# Patient Record
Sex: MALE | Race: WHITE | NOT HISPANIC OR LATINO | Employment: UNEMPLOYED
[De-identification: names, ages, dates, MRNs, and addresses within clinical notes are randomized per-mention and may not be internally consistent; named-entity substitution may affect disease eponyms.]

---

## 2018-07-03 ENCOUNTER — RECORDS - HEALTHEAST (OUTPATIENT)
Dept: ADMINISTRATIVE | Facility: OTHER | Age: 58
End: 2018-07-03

## 2018-07-12 ENCOUNTER — RECORDS - HEALTHEAST (OUTPATIENT)
Dept: LAB | Facility: HOSPITAL | Age: 58
End: 2018-07-12

## 2018-07-12 ENCOUNTER — HOSPITAL ENCOUNTER (OUTPATIENT)
Dept: MRI IMAGING | Facility: HOSPITAL | Age: 58
Discharge: HOME | End: 2018-07-12
Attending: PSYCHIATRY & NEUROLOGY

## 2018-07-12 ENCOUNTER — HOSPITAL ENCOUNTER (OUTPATIENT)
Dept: RADIOLOGY | Facility: HOSPITAL | Age: 58
Discharge: HOME | End: 2018-07-12
Attending: PSYCHIATRY & NEUROLOGY

## 2018-07-12 DIAGNOSIS — G25.81 RLS (RESTLESS LEGS SYNDROME): ICD-10-CM

## 2018-07-12 DIAGNOSIS — G35 MULTIPLE SCLEROSIS (H): ICD-10-CM

## 2018-07-12 DIAGNOSIS — R25.2 SPASTICITY: ICD-10-CM

## 2018-07-12 DIAGNOSIS — S05.40XA FOREIGN BODY BEHIND THE EYE: ICD-10-CM

## 2018-07-12 DIAGNOSIS — R27.0 ATAXIA: ICD-10-CM

## 2018-07-12 LAB
TSH SERPL DL<=0.005 MIU/L-ACNC: 0.44 UIU/ML (ref 0.3–5)
VIT B12 SERPL-MCNC: 578 PG/ML (ref 213–816)

## 2020-09-04 PROBLEM — L25.9 CONTACT DERMATITIS AND OTHER ECZEMA, DUE TO UNSPECIFIED CAUSE: Status: ACTIVE | Noted: 2020-09-04

## 2020-09-04 PROBLEM — M54.16 LUMBAR RADICULOPATHY: Status: ACTIVE | Noted: 2020-09-04

## 2020-09-04 PROBLEM — J42 CHRONIC BRONCHITIS (H): Status: ACTIVE | Noted: 2020-09-04

## 2020-09-04 PROBLEM — R25.2 SPASTICITY: Status: ACTIVE | Noted: 2020-09-04

## 2020-09-04 PROBLEM — M75.81 RIGHT ROTATOR CUFF TENDONITIS: Status: ACTIVE | Noted: 2019-11-22

## 2020-09-04 PROBLEM — R27.0 ATAXIA: Status: ACTIVE | Noted: 2020-09-04

## 2020-09-04 PROBLEM — M19.011 OSTEOARTHRITIS OF RIGHT ACROMIOCLAVICULAR JOINT: Status: ACTIVE | Noted: 2019-11-22

## 2020-09-04 PROBLEM — E78.5 HYPERLIPIDEMIA: Status: ACTIVE | Noted: 2020-09-04

## 2020-09-04 PROBLEM — R53.83 FATIGUE: Status: ACTIVE | Noted: 2020-09-04

## 2020-09-04 PROBLEM — R52 PAIN: Status: ACTIVE | Noted: 2020-09-04

## 2020-09-04 PROBLEM — R29.890 LOSS OF HEIGHT: Status: ACTIVE | Noted: 2020-09-04

## 2020-09-04 PROBLEM — M54.50 LOWER BACK PAIN: Status: ACTIVE | Noted: 2020-09-04

## 2020-09-04 PROBLEM — E29.1 OTHER TESTICULAR HYPOFUNCTION: Status: ACTIVE | Noted: 2020-09-04

## 2020-09-08 ENCOUNTER — OFFICE VISIT (OUTPATIENT)
Dept: NEUROLOGY | Facility: CLINIC | Age: 60
End: 2020-09-08
Payer: MEDICARE

## 2020-09-08 VITALS — HEIGHT: 66 IN | BODY MASS INDEX: 26.52 KG/M2 | WEIGHT: 165 LBS

## 2020-09-08 DIAGNOSIS — G35 MULTIPLE SCLEROSIS (H): Primary | ICD-10-CM

## 2020-09-08 DIAGNOSIS — R41.3 MEMORY LOSS: ICD-10-CM

## 2020-09-08 PROCEDURE — 99215 OFFICE O/P EST HI 40 MIN: CPT | Mod: 95 | Performed by: PSYCHIATRY & NEUROLOGY

## 2020-09-08 RX ORDER — PRAMIPEXOLE DIHYDROCHLORIDE 0.5 MG/1
0.5 TABLET ORAL AT BEDTIME
Qty: 90 TABLET | Refills: 3 | Status: SHIPPED | OUTPATIENT
Start: 2020-09-08 | End: 2021-10-25

## 2020-09-08 RX ORDER — PRAMIPEXOLE DIHYDROCHLORIDE 0.12 MG/1
1 TABLET ORAL 2 TIMES DAILY
COMMUNITY
Start: 2020-08-18 | End: 2020-09-08

## 2020-09-08 RX ORDER — PRAMIPEXOLE DIHYDROCHLORIDE 0.5 MG/1
1 TABLET ORAL DAILY
COMMUNITY
Start: 2020-08-07 | End: 2020-09-08

## 2020-09-08 RX ORDER — BACLOFEN 10 MG/1
10 TABLET ORAL 4 TIMES DAILY
Qty: 360 TABLET | Refills: 3 | Status: SHIPPED | OUTPATIENT
Start: 2020-09-08 | End: 2022-01-31

## 2020-09-08 RX ORDER — BACLOFEN 10 MG/1
10 TABLET ORAL 3 TIMES DAILY
COMMUNITY
End: 2020-09-08

## 2020-09-08 RX ORDER — PRAMIPEXOLE DIHYDROCHLORIDE 0.12 MG/1
0.12 TABLET ORAL 2 TIMES DAILY
Qty: 180 TABLET | Refills: 3 | Status: SHIPPED | OUTPATIENT
Start: 2020-09-08 | End: 2022-01-11

## 2020-09-08 RX ORDER — PSYLLIUM HUSK 0.4 G
1000 CAPSULE ORAL DAILY
COMMUNITY

## 2020-09-08 ASSESSMENT — MIFFLIN-ST. JEOR: SCORE: 1496.19

## 2020-09-08 NOTE — NURSING NOTE
Chief Complaint   Patient presents with     Follow Up     MS-states he is doing pretty well however would like to discuss the RLS and possibly increase the medication for that, also would like to discuss some memory changes with difficulty recalling at times      Video visit [phone]-doximcosmo Lamb on 9/8/2020 at 11:25 AM

## 2020-09-08 NOTE — LETTER
"    9/8/2020         RE: Tim Driscoll  [address]  [facility] [address]        Dear Colleague,    Thank you for referring your patient, Tim Driscoll, to the [facility] [place]. Please see a copy of my visit note below.    Video visit requested by patient  Video visit due to the COVID-19 pandemic  Text number [phone]  Patient identified      .Patient is being evaluated via a billable video visit. The patient has been notified of following:     \"This video visit will be conducted via a call between you and your physician/provider. We have found that certain health care needs can be provided without the need for an in-person physical exam. This service lets us provide the care you need with a video conversation. If a prescription is necessary we can send it directly to your pharmacy. If lab work is needed we can place an order for that and you can then stop by our lab to have the test done at a later time.    If during the course of the call the physician/provider feels a video visit is not appropriate, you will not be charged for this service.    Physician has received verbal consent for a Video Visit from the patient? YES    Patient would like the video invitation sent by: Text [phone]    Video Visit Details    Type of service: Video Visit    Video Start Time: 11:43 AM    Video End Time (time video stopped): 12:15 PM    Originating Location (pt. Location): Patient's Home    Distant Location (provider location): [facility]     Mode of Communication: Video Conference via [employer]                    Chief complaint  Multiple sclerosis diagnosed sometime around 2000/1999  Spasticity and tightness of his legs  Spastic gait which aggravates his hips  Some restless leg type symptomatology  Past history of lumbar fusion L3-L5 in the past      Patient is a 60-year-old  Original multiple sclerosis diagnosed in 1999 or so  Has had " difficulty with side effects from medicines and actually passed out with 1 of the injectable medicine so Gilenya would not be a good choice for him    We talked about Tecfidera again    He has a new complaint where he feels that he has trouble with his attention and memory  Wife confirms that he will forget things in the middle of a conversation with a slight hand he is back on track  It can happen daily  He might snore a little bit but not as much as his wife  Does have the restless leg which too, and this disrupts sleep  He has a lot of fatigue      He has some chronic lazy eye and double vision horizontally split but he does not think it is a lot worse    Still does not want to use a cane or walking stick    Talk to him about gait safety though as he has been climbing on ladders recommended that he not do this due to his poor balance    Since last seen  No hospitalizations no surgeries no major illnesses  No falls or injuries      Patient states his long as he is busy up and around moving his hips are too bad after he goes to sleep he wakes up in the morning there is stiffer and sore they limber up as he moves around    Does not really have radicular symptoms  Denies any new bowel or bladder difficulty                  some workup in 2018 showin. B12 578, TSH 0.44.  2. MRI scan of the head with and without contrast scattered T2 signal FLAIR changes consistent with MS, but no active enhancement.  3. of the cervical spine, no cord lesions, but degenerative changes, see official report.  4. Thoracic spine MRI shows:  a. T8 6 mm cord lesion with equivocal enhancement.  b. T10 old demyelinating lesion.      1. EMG on 2018 of the lower extremities which was relatively normal.  2. History of back fusion five years ago.  3. Difficulty with his left hip and leg stiffening up, which I think is more from his multiple sclerosis.  4. Does use a little bit of baclofen but uses it more on a p.r.n.  basis.      Patient s past history:    1. Multiple sclerosis diagnosed in 2000/1999 with LP and MRI, followed by Dr. Tru Snow, treated with Copaxone until October 2017.  2. Had an anaphylactic type reaction to Copaxone sometime after being on the medication for almost 15 years.  3. Had a flare-up of paresthesias around the belly button or below in early or mid June 2018 and has spastic legs chronically with some bladder urgency chronically.    Other past history:    1. Low lumbar pain, L3-L5 fusion in the past.  2. Eczema.  3. Restless legs syndrome.  4. Diverticulosis.  5. Carpal tunnel syndrome.    Habits  Patient does smoke a quarter pack of cigarettes per day,  Alcohol, might have two beers or so per week.    Family history is positive for:  Mother with breast cancer, may have gone to the bone.  Father is living at 88.  Four sisters relatively healthy.  One brother with MS.  Two other brothers relatively healthy.                  Review of Systems   No headache no chest pain no shortness of breath no nausea vomiting no diarrhea no fever chills no cough no sore throat    Denies snoring at night at least not as much as his wife    Does have significant fatigue    Urination is okay  Bowel bladder adequate  No new numbness  No new weakness     From his MS might have some rare diplopia or blurry vision at the end of the day when he is tired mostly later in the day when he is watching TV he covers one eye    Swallowing okay  No dysphasia or dysarthria  Arms work relatively okay    Otherwise review systems are negative    General exam  HEENT wears corrective lenses  Moving air well  Abdomen soft  No edema the feet      Neurologic exam  Alert oriented x3  No aphasia  No neglect   year is 2020  Month of September  Date is Tuesday  President is [place]   is Jael    Conversational speech is good      Cranial nerves II through XII  At the end of the day gets some horizontally split vision things he has  had a lazy eye for years  Difficult to see any ophthalmoplegia no nystagmus on exam  Visual fields intact  Since he can talks out of side of his mouth but that his baseline  Tongue twisters are actually good            Upper extremities  No drift  No tremor  A little bit of incoordination with finger-nose bilaterally    Lower extremities  Increased tone little spasticity  Negative straight leg raising sign  No actual focal weakness  Walks with a spastic type gait throws his hips out     gait  Slightly wider base  Hesitancy  Spasticity in the gait           Assessment/Plan       MULTIPLE SCLEROSIS (G35)   History of multiple sclerosis since 1999 currently off immunosuppression  Failed Copaxone and beta interferons in the past, had passing out with 1 of the interferons      Have discussed Tecfidera in the past and he would like to hold off   Symptomatic medicine for treatment of his difficulties    Baclofen 10 mg tab 2 tabs p.o. twice daily usually just use the nighttime dose  Mirapex 0.5 mg at nighttime  Mirapex 0.125 mg 1 tablet p.o. twice daily uses more as needed [place] vitamin D 3 supplement    Currently off immunosuppression did not want to start new treatments     has new complaint of memory difficulty  We will check  B12 and thyroids again  If he snores more in the future consider a sleep study  Check EEG  Check MRI scan of the head to see if there is progression of intracranial plaques    Spasticity and leg difficulties are mainly from his cord plaque    We discussed immunosuppression with Tecfidera again today    82 up his medications for his restless leg a little bit    Follow-up after the above testing face-to-face    45 minutes total care time today greater than 50% face-to-face patient discussing his previous MS medication side effects and benefits of new difficulties with memory.            Currently, he has:    1. Multiple sclerosis since 1999, off immunosuppression.  2. Failed Copaxone and beta  interferons in the past.  3. Last exacerbation was probably in June 2018.  4. Mildly active lesion 6 mm cord lesion at T8 on MRI scan on July 12, 2018.  5. Restless legs syndrome, on Mirapex.  6. Spasticity on baclofen.  7. Rediscussed the Tecfidera.  8. Discussed that he needs to address the hypertension with his primary.  9. He wants to reassess his low back.    Recommend:    1. Consider seeing Ortho about the Hips   2. Continue Baclofen 10 mg tablet, two at night with two other tablets as rescue. He does say that it helped, but he does not use them often.  3. Mirapex 0.5 mg once at nighttime.  4. Mirapex 0.125 mg one tablet p.o. b.i.d., uses more p.r.n.  5. Vitamin D3 supplement.  6. have him reconsider going on immunosuppression.          Again, thank you for allowing me to participate in the care of your patient.        Sincerely,        Tim Rouse MD

## 2020-09-08 NOTE — PATIENT INSTRUCTIONS
Patient Education     Understanding Multiple Sclerosis (MS)    Multiple sclerosis (MS) is a disease of the brain and spinal cord. Unfortunately, there is no cure for MS. But there are many treatments available. Many people with MS can manage their symptoms and lead active, healthy lives. Read on to learn more about MS and its treatments.  What is MS?  The brain is the body s control center. Each part of the brain controls specific functions. These include movement, balance, sensation, and reasoning. The brain controls these functions by sending and receiving messages through nerves. Nerves have a protective covering (myelin). With MS, the myelin on nerves in the brain and spinal cord is damaged. The loss of this covering causes messages traveling along affected nerves to slow or stop. This results in MS symptoms.  Causes and risk factors for MS  Experts don't know what causes MS. But most research suggests that the body s immune system attacks and destroys myelin by mistake. MS most often begins in adults between ages 20 and 40. It happens in women more often than men. It also is more likely to occur if a person has a family history of MS. Smoking does not cause MS, but can make it worse.    Types of MS  There are 4 main types of MS. These are:    Relapsing-remitting MS. This type of MS is the most common. It is marked by isolated episodes of symptoms (also called attacks or flare-ups). Periods of partial or complete recovery follow these episodes. Each attack may be worse than the one before it.    Primary-progressive MS. This type of MS is marked by a slow onset of symptoms that gradually worsen over time. There are no periods of recovery.    Secondary-progressive MS. This type of MS begins as relapsing-remitting MS. After a period of stability, the disease steadily gets worse. About 50% of people with relapsing-remitting MS have secondary-progressive MS within 10 years of their first attack. About all of them  have it within 25 years.    Progressive-relapsing MS. This type of MS includes both slowly progressive symptoms and periods of flare-ups together.   Symptoms of MS  MS symptoms vary from person to person. The type of symptoms a person has depends on which nerves are affected. It also depends on how much nerve damage there is in the brain and spinal cord. A person can also have different symptoms during the course of the disease. Symptoms can include:    Extreme tiredness (fatigue)    Numbness, tingling, or loss of feeling    Pain    Muscle spasms or weakness in the arms, legs, or both    Vision problems, such as rapid eye movements, double vision, or vision loss    Balance and coordination problems    Problems walking or moving the arms, legs, or both    Bowel and bladder control problems    Problems with sexual function    Dizziness    Trouble concentrating, focusing, or remembering things    Trouble reasoning and solving problems    Trouble speaking or swallowing    Depression  Diagnosing MS  MS can be hard to diagnose. Symptoms come and go. They also may seem like those of other health problems. A diagnosis of MS is not made unless a person has had at least 2 or more separate episodes of MS symptoms. To confirm a diagnosis of MS, your healthcare provider will take a detailed history of symptoms. He or she will also give you a neurologic exam to check your muscle strength, balance, coordination, and reflexes. Skills such as thinking, memory, vision, hearing, and talking are also checked. In addition, healthcare providers may also give you the following tests:    MRI. This test provides detailed pictures of the brain and spinal cord. It helps check for areas of damaged nerves. These are often referred to as lesions or plaques.    Visual evoked potentials. This test is done to see how well your optic nerves are working.    Spinal tap (also called lumbar puncture). This test checks the health of the fluid around your  brain and spinal cord for signs of nerve sheath damage (demyelination).    Blood tests. These help rule out other causes of the symptoms.  Treating MS  The goal of treatment is to manage your symptoms and slow the rate at which the disease worsens. You can manage your symptoms in 1 or more of the following ways:    Medicines. Some medicines help keep your body s immune system from attacking the myelin. This may reduce the frequency and severity of attacks. Other medicines help control symptoms or relieve pain when attacks happen.    Rehabilitation (rehab). Symptoms or problems due to MS can interfere with daily living. Rehab includes physical, occupational, or speech therapy. This can help you maintain strength and function. If needed, your healthcare provider will prescribe aids such as canes, walkers, or wheelchairs. You can also make changes in your work or living space to improve your safety.    Supportive services. These include counseling and support groups to help you cope with the challenges of living with MS. Family members and friends may also benefit from these services.    Lifestyle changes. Making certain changes in your lifestyle and daily routine may help you manage symptoms. This includes getting enough rest and regular exercise. It also includes eating healthy foods and reducing stress. It's helpful to figure out and stay away from things that trigger MS episodes.    Other treatments. Researchers are exploring new treatments for MS. Many of these are in clinical trials. This means they are being tested for safety and effectiveness. Your healthcare providers can give you more information about any treatments that might be an option for you.  Long-term concerns  MS is an unpredictable disease. Your experience will be different from other people's experiences. In general, if you have MS, you should have regular visits with your healthcare provider. He or she will watch your symptoms. Your healthcare  provider will review how well your medicines and other treatments work. MS symptoms may get worse as your disease progresses. If this happens, you may need more aggressive care and treatments. Visit the resources below to learn more about MS and what advances researchers are making to find a cure:    National Multiple Sclerosis Society, www.nationalmssociety.org    Multiple Sclerosis Association of [place], www.mymsaa.org  Date Last Reviewed: 2/1/2018 2000-2019 [employer] [address] [place] [address], [place] [address]. All rights reserved. This information is not intended as a substitute for professional medical care. Always follow your healthcare professional's instructions.

## 2020-09-08 NOTE — PROGRESS NOTES
"Video visit requested by patient  Video visit due to the COVID-19 pandemic  Text number [phone]  Patient identified      .Patient is being evaluated via a billable video visit. The patient has been notified of following:     \"This video visit will be conducted via a call between you and your physician/provider. We have found that certain health care needs can be provided without the need for an in-person physical exam. This service lets us provide the care you need with a video conversation. If a prescription is necessary we can send it directly to your pharmacy. If lab work is needed we can place an order for that and you can then stop by our lab to have the test done at a later time.    If during the course of the call the physician/provider feels a video visit is not appropriate, you will not be charged for this service.    Physician has received verbal consent for a Video Visit from the patient? YES    Patient would like the video invitation sent by: Text [phone]    Video Visit Details    Type of service: Video Visit    Video Start Time: 11:43 AM    Video End Time (time video stopped): 12:15 PM    Originating Location (pt. Location): Patient's Home    Distant Location (provider location): [facility] [place]     Mode of Communication: Video Conference via [employer]                    Chief complaint  Multiple sclerosis diagnosed sometime around 2000/1999  Spasticity and tightness of his legs  Spastic gait which aggravates his hips  Some restless leg type symptomatology  Past history of lumbar fusion L3-L5 in the past      Patient is a 60-year-old  Original multiple sclerosis diagnosed in 1999 or so  Has had difficulty with side effects from medicines and actually passed out with 1 of the injectable medicine so Gilenya would not be a good choice for him    We talked about Tecfidera again    He has a new complaint where he feels that he has trouble with his attention and " memory  Wife confirms that he will forget things in the middle of a conversation with a slight hand he is back on track  It can happen daily  He might snore a little bit but not as much as his wife  Does have the restless leg which too, and this disrupts sleep  He has a lot of fatigue      He has some chronic lazy eye and double vision horizontally split but he does not think it is a lot worse    Still does not want to use a cane or walking stick    Talk to him about gait safety though as he has been climbing on ladders recommended that he not do this due to his poor balance    Since last seen  No hospitalizations no surgeries no major illnesses  No falls or injuries      Patient states his long as he is busy up and around moving his hips are too bad after he goes to sleep he wakes up in the morning there is stiffer and sore they limber up as he moves around    Does not really have radicular symptoms  Denies any new bowel or bladder difficulty                  some workup in 2018 showin. B12 578, TSH 0.44.  2. MRI scan of the head with and without contrast scattered T2 signal FLAIR changes consistent with MS, but no active enhancement.  3. of the cervical spine, no cord lesions, but degenerative changes, see official report.  4. Thoracic spine MRI shows:  a. T8 6 mm cord lesion with equivocal enhancement.  b. T10 old demyelinating lesion.      1. EMG on 2018 of the lower extremities which was relatively normal.  2. History of back fusion five years ago.  3. Difficulty with his left hip and leg stiffening up, which I think is more from his multiple sclerosis.  4. Does use a little bit of baclofen but uses it more on a p.r.n. basis.      Patient s past history:    1. Multiple sclerosis diagnosed in  with LP and MRI, followed by Dr. Tru Snow, treated with Copaxone until 2017.  2. Had an anaphylactic type reaction to Copaxone sometime after being on the medication for  almost 15 years.  3. Had a flare-up of paresthesias around the belly button or below in early or mid June 2018 and has spastic legs chronically with some bladder urgency chronically.    Other past history:    1. Low lumbar pain, L3-L5 fusion in the past.  2. Eczema.  3. Restless legs syndrome.  4. Diverticulosis.  5. Carpal tunnel syndrome.    Habits  Patient does smoke a quarter pack of cigarettes per day,  Alcohol, might have two beers or so per week.    Family history is positive for:  Mother with breast cancer, may have gone to the bone.  Father is living at 88.  Four sisters relatively healthy.  One brother with MS.  Two other brothers relatively healthy.                  Review of Systems   No headache no chest pain no shortness of breath no nausea vomiting no diarrhea no fever chills no cough no sore throat    Denies snoring at night at least not as much as his wife    Does have significant fatigue    Urination is okay  Bowel bladder adequate  No new numbness  No new weakness     From his MS might have some rare diplopia or blurry vision at the end of the day when he is tired mostly later in the day when he is watching TV he covers one eye    Swallowing okay  No dysphasia or dysarthria  Arms work relatively okay    Otherwise review systems are negative    General exam  HEENT wears corrective lenses  Moving air well  Abdomen soft  No edema the feet      Neurologic exam  Alert oriented x3  No aphasia  No neglect   year is 2020  Month of September  Date is Tuesday  President is [place]   is Jael    Conversational speech is good      Cranial nerves II through XII  At the end of the day gets some horizontally split vision things he has had a lazy eye for years  Difficult to see any ophthalmoplegia no nystagmus on exam  Visual fields intact  Since he can talks out of side of his mouth but that his baseline  Tongue twisters are actually good            Upper extremities  No drift  No tremor  A little  bit of incoordination with finger-nose bilaterally    Lower extremities  Increased tone little spasticity  Negative straight leg raising sign  No actual focal weakness  Walks with a spastic type gait throws his hips out     gait  Slightly wider base  Hesitancy  Spasticity in the gait           Assessment/Plan       MULTIPLE SCLEROSIS (G35)   History of multiple sclerosis since 1999 currently off immunosuppression  Failed Copaxone and beta interferons in the past, had passing out with 1 of the interferons      Have discussed Tecfidera in the past and he would like to hold off   Symptomatic medicine for treatment of his difficulties    Baclofen 10 mg tab 2 tabs p.o. twice daily usually just use the nighttime dose  Mirapex 0.5 mg at nighttime  Mirapex 0.125 mg 1 tablet p.o. twice daily uses more as needed [place] vitamin D 3 supplement    Currently off immunosuppression did not want to start new treatments     has new complaint of memory difficulty  We will check  B12 and thyroids again  If he snores more in the future consider a sleep study  Check EEG  Check MRI scan of the head to see if there is progression of intracranial plaques    Spasticity and leg difficulties are mainly from his cord plaque    We discussed immunosuppression with Tecfidera again today    82 up his medications for his restless leg a little bit    Follow-up after the above testing face-to-face    45 minutes total care time today greater than 50% face-to-face patient discussing his previous MS medication side effects and benefits of new difficulties with memory.            Currently, he has:    1. Multiple sclerosis since 1999, off immunosuppression.  2. Failed Copaxone and beta interferons in the past.  3. Last exacerbation was probably in June 2018.  4. Mildly active lesion 6 mm cord lesion at T8 on MRI scan on July 12, 2018.  5. Restless legs syndrome, on Mirapex.  6. Spasticity on baclofen.  7. Rediscussed the Tecfidera.  8. Discussed that he  needs to address the hypertension with his primary.  9. He wants to reassess his low back.    Recommend:    1. Consider seeing Ortho about the Hips   2. Continue Baclofen 10 mg tablet, two at night with two other tablets as rescue. He does say that it helped, but he does not use them often.  3. Mirapex 0.5 mg once at nighttime.  4. Mirapex 0.125 mg one tablet p.o. b.i.d., uses more p.r.n.  5. Vitamin D3 supplement.  6. have him reconsider going on immunosuppression.

## 2020-09-14 DIAGNOSIS — R41.3 MEMORY LOSS: ICD-10-CM

## 2020-09-14 DIAGNOSIS — G35 MS (MULTIPLE SCLEROSIS) (H): Primary | ICD-10-CM

## 2020-09-17 DIAGNOSIS — R41.3 MEMORY LOSS: ICD-10-CM

## 2020-09-17 DIAGNOSIS — G35 MS (MULTIPLE SCLEROSIS) (H): ICD-10-CM

## 2020-09-17 LAB
TSH SERPL DL<=0.005 MIU/L-ACNC: 0.63 MU/L (ref 0.4–4)
VIT B12 SERPL-MCNC: 594 PG/ML (ref 193–986)

## 2020-09-17 PROCEDURE — 36415 COLL VENOUS BLD VENIPUNCTURE: CPT | Performed by: PSYCHIATRY & NEUROLOGY

## 2020-09-17 PROCEDURE — 84443 ASSAY THYROID STIM HORMONE: CPT | Performed by: PSYCHIATRY & NEUROLOGY

## 2020-09-17 PROCEDURE — 82607 VITAMIN B-12: CPT | Performed by: PSYCHIATRY & NEUROLOGY

## 2020-09-22 ENCOUNTER — CARE COORDINATION (OUTPATIENT)
Dept: NEUROLOGY | Facility: CLINIC | Age: 60
End: 2020-09-22

## 2020-09-22 ENCOUNTER — HOSPITAL ENCOUNTER (EMERGENCY)
Facility: CLINIC | Age: 60
End: 2020-09-22
Payer: MEDICARE

## 2020-09-22 ENCOUNTER — HOSPITAL ENCOUNTER (OUTPATIENT)
Dept: MRI IMAGING | Facility: CLINIC | Age: 60
Discharge: HOME | End: 2020-09-22
Attending: PSYCHIATRY & NEUROLOGY | Admitting: PSYCHIATRY & NEUROLOGY
Payer: MEDICARE

## 2020-09-22 PROCEDURE — 25500064 ZZH RX 255 OP 636: Performed by: PSYCHIATRY & NEUROLOGY

## 2020-09-22 PROCEDURE — A9585 GADOBUTROL INJECTION: HCPCS | Performed by: PSYCHIATRY & NEUROLOGY

## 2020-09-22 PROCEDURE — 70553 MRI BRAIN STEM W/O & W/DYE: CPT

## 2020-09-22 RX ORDER — GADOBUTROL 604.72 MG/ML
7 INJECTION INTRAVENOUS ONCE
Status: COMPLETED | OUTPATIENT
Start: 2020-09-22 | End: 2020-09-22

## 2020-09-22 RX ADMIN — GADOBUTROL 7 ML: 604.72 INJECTION INTRAVENOUS at 09:31

## 2020-11-24 ENCOUNTER — ANCILLARY PROCEDURE (OUTPATIENT)
Dept: NEUROLOGY | Facility: CLINIC | Age: 60
End: 2020-11-24
Attending: PSYCHIATRY & NEUROLOGY
Payer: MEDICARE

## 2020-11-24 VITALS
SYSTOLIC BLOOD PRESSURE: 128 MMHG | HEART RATE: 60 BPM | DIASTOLIC BLOOD PRESSURE: 88 MMHG | WEIGHT: 153 LBS | BODY MASS INDEX: 24.59 KG/M2 | HEIGHT: 66 IN

## 2020-11-24 DIAGNOSIS — R41.3 MEMORY LOSS: ICD-10-CM

## 2020-11-24 DIAGNOSIS — G35 MULTIPLE SCLEROSIS (H): Primary | ICD-10-CM

## 2020-11-24 PROCEDURE — 99214 OFFICE O/P EST MOD 30 MIN: CPT | Performed by: PSYCHIATRY & NEUROLOGY

## 2020-11-24 PROCEDURE — 95816 EEG AWAKE AND DROWSY: CPT | Performed by: PSYCHIATRY & NEUROLOGY

## 2020-11-24 SDOH — HEALTH STABILITY: MENTAL HEALTH: HOW OFTEN DO YOU HAVE A DRINK CONTAINING ALCOHOL?: 4 OR MORE TIMES A WEEK

## 2020-11-24 SDOH — HEALTH STABILITY: MENTAL HEALTH: HOW OFTEN DO YOU HAVE 6 OR MORE DRINKS ON ONE OCCASION?: NOT ASKED

## 2020-11-24 SDOH — HEALTH STABILITY: MENTAL HEALTH: HOW MANY STANDARD DRINKS CONTAINING ALCOHOL DO YOU HAVE ON A TYPICAL DAY?: NOT ASKED

## 2020-11-24 ASSESSMENT — MIFFLIN-ST. JEOR: SCORE: 1441.75

## 2020-11-24 NOTE — LETTER
11/24/2020         RE: Tim Driscoll  [id number]  [facility] [id number]        Dear Colleague,    Thank you for referring your patient, Tim Driscoll, to the [facility] [place]. Please see a copy of my visit note below.    In person evaluation neurologic consultation/follow-up in regards to MS/memory difficulties            Patient last seen on video follow-up September 8, 2020  Patient seen in person on November 24, 2020      Chief complaint  Multiple sclerosis diagnosed sometime around 2000/1999  Spasticity and tightness of his legs  Spastic gait which aggravates his hips  Some restless leg type symptomatology  Past history of lumbar fusion L3-L5 in the past      Patient is a 60-year-old  Original multiple sclerosis diagnosed in 1999 or so  Has had difficulty with side effects from medicines and actually passed out with 1 of the injectable medicine so Gilenya would not be a good choice for him    We talked about Tecfidera again    He has a new complaint where he feels that he has trouble with his attention and memory  Wife confirms that he will forget things in the middle of a conversation with a slight hand he is back on track  It can happen daily  He might snore a little bit but not as much as his wife  Does have the restless leg which too, and this disrupts sleep  He has a lot of fatigue    Work-up for memory loss  B12 594  TSH 0.23  MRI scan September 22, 2020 stable compared to 2005  EEG normal awake drowsy and sleep stage I record  Formal Willard cognitive assessment score 29 out of 30        He has some chronic lazy eye and double vision horizontally split but he does not think it is a lot worse    Still does not want to use a cane or walking stick    Talk to him about gait safety though as he has been climbing on ladders recommended that he not do this due to his poor balance    Since last seen  No hospitalizations no surgeries no major illnesses  No falls  or injuries      Patient states his long as he is busy up and around moving his hips are too bad after he goes to sleep he wakes up in the morning there is stiffer and sore they limber up as he moves around    Does not really have radicular symptoms  Denies any new bowel or bladder difficulty                  some workup in 2018 showin. B12 578, TSH 0.44.  2. MRI scan of the head with and without contrast scattered T2 signal FLAIR changes consistent with MS, but no active enhancement.  3. of the cervical spine, no cord lesions, but degenerative changes, see official report.  4. Thoracic spine MRI shows:  a. T8 6 mm cord lesion with equivocal enhancement.  b. T10 old demyelinating lesion.      1. EMG on 2018 of the lower extremities which was relatively normal.  2. History of back fusion five years ago.  3. Difficulty with his left hip and leg stiffening up, which I think is more from his multiple sclerosis.  4. Does use a little bit of baclofen but uses it more on a p.r.n. basis.      Patient s past history:    1. Multiple sclerosis diagnosed in  with LP and MRI, followed by Dr. Tru Snow, treated with Copaxone until 2017.  2. Had an anaphylactic type reaction to Copaxone sometime after being on the medication for almost 15 years.  3. Had a flare-up of paresthesias around the belly button or below in early or mid 2018 and has spastic legs chronically with some bladder urgency chronically.    Other past history:    1. Low lumbar pain, L3-L5 fusion in the past.  2. Eczema.  3. Restless legs syndrome.  4. Diverticulosis.  5. Carpal tunnel syndrome.    Habits  Patient does smoke a quarter pack of cigarettes per day,  Alcohol, might have two beers or so per week.    Family history is positive for:  Mother with breast cancer, may have gone to the bone.  Father is living at 88.  Four sisters relatively healthy.  One brother with MS.  Two other brothers relatively  healthy.            Exam  Blood pressure 128/88, pulse 60    Review of systems  No headache no chest pain no shortness of breath no nausea vomiting no diarrhea no fever chills no sore throat no cough    No new focal weakness numbness or tingling  Does have the MS and a little bit of a spastic gait  Wife previously had reported that he might snore but not as much as his wife  Does have some fatigue but could be from MS    Urination is okay  Bowel bladder adequate    From his MS might have some rare diplopia or blurry vision at the end of the day when he is tired mostly later in the day when he is watching TV he covers one eye    Swallowing okay  No dysphasia or dysarthria  Arms work relatively okay    Otherwise review systems are negative    General exam  Alert oriented x3  Neck supple  HEENT normal wears corrective lenses  Lungs clear  Heart rate regular  Abdomen soft  Symmetrical pulses  No edema the feet      Neurologic exam  Prosody of speech normal  Naming normal  Repetition normal  Comprehension normal  No aphasia  No neglect    Formal Dickerson cognitive assessment score 29 out of 30  He did use a memory technique though to remember the 5 words  Had difficulty with the visual spatial sequencing Trail    Able to talk about current events without difficulty    Cranial nerves II through XII significant for  Lazy eye but no significant ophthalmoplegia or nystagmus seen today  Visual fields intact  Talks on the side of his mouth baseline  Tongue twisters good  Visual fields intact face symmetrical    Upper extremities  No drift no tremor  With finger-nose maybe a little bit of incoordination subtle    Lower extremities  Mild increased tone bilaterally spastic  Negative straight leg raising sign  No actual weakness  Walks with a spastic type gait can you throws his hips out suddenly    Gait  Gets up from the chair without difficulty has a mildly spastic gait slightly wider base  Hesitancy  Spasticity in the  gait           Assessment/Plan    Currently, he has:    1. Multiple sclerosis since 1999, off immunosuppression.  2. Failed Copaxone and beta interferons in the past.  3. Last exacerbation was probably in June 2018.  4. Mildly active lesion 6 mm cord lesion at T8 on MRI scan on July 12, 2018.  Follow-up MRI scan head September 22, 2020 similar to 2005 no active lesions  5. Restless legs syndrome, on Mirapex.  6. Spasticity on baclofen.  7. Rediscussed the Tecfidera.  8. Discussed that he needs to address the hypertension with his primary.  9. He wants to reassess his low back.    Recommend:    1. Consider seeing Ortho about the Hips   2. Continue Baclofen 10 mg tablet, two at night with two other tablets as rescue. He does say that it helped, but he does not use them often.  3. Mirapex 0.5 mg once at nighttime.  4. Mirapex 0.125 mg one tablet p.o. b.i.d., uses more p.r.n.  5. Vitamin D3 supplement.  6. have him reconsider going on immunosuppression.         MULTIPLE SCLEROSIS (G35)   History of multiple sclerosis since 1999 currently off immunosuppression  Failed Copaxone and beta interferons in the past, had passing out with 1 of the interferons      Have discussed Tecfidera in the past and he would like to hold off   Symptomatic medicine for treatment of his difficulties    Baclofen 10 mg tab 2 tabs p.o. twice daily usually just use the nighttime dose  Mirapex 0.5 mg at nighttime  Mirapex 0.125 mg 1 tablet p.o. twice daily uses more as needed   vitamin D 3 supplement    Currently off immunosuppression did not want to start new treatments         has new complaint of memory difficulty  We will check  B12 and thyroid testing normal  EEG normal  MRI scan September 22, 2020 compared to 2005 plaque load stable no active lesions    Willard cog assessment score today 29 out of 30 did fairly well    Spasticity and leg difficulties are mainly from his cord plaque    We discussed immunosuppression with Tecfidera again  today      Recent work-up  Labs September 17, 2020  TSH 0.23, B12 594  MRI scan brain September 22, 2020  T1 lesion load low  T2 lesion load moderate  No enhancing lesions  Similar plaque load compared to 2005 stable      If in the future memory is falling off consider looking at a sleep study    Patient wants to hold off at this time    Today's visit was 30 minutes care time with greater than 50% face-to-face discussing his memory cognition and testing above patient will follow-up in August 2021              Again, thank you for allowing me to participate in the care of your patient.        Sincerely,        Tim Rouse MD

## 2020-11-24 NOTE — NURSING NOTE
Chief Complaint   Patient presents with     Results     Discuss EEG and MRI results (MRI done at )     Faby Mao LPN on 11/24/2020 at 11:21 AM

## 2020-11-24 NOTE — PROGRESS NOTES
In person evaluation neurologic consultation/follow-up in regards to MS/memory difficulties            Patient last seen on video follow-up September 8, 2020  Patient seen in person on November 24, 2020      Chief complaint  Multiple sclerosis diagnosed sometime around 2000/1999  Spasticity and tightness of his legs  Spastic gait which aggravates his hips  Some restless leg type symptomatology  Past history of lumbar fusion L3-L5 in the past      Patient is a 60-year-old  Original multiple sclerosis diagnosed in 1999 or so  Has had difficulty with side effects from medicines and actually passed out with 1 of the injectable medicine so Gilenya would not be a good choice for him    We talked about Tecfidera again    He has a new complaint where he feels that he has trouble with his attention and memory  Wife confirms that he will forget things in the middle of a conversation with a slight hand he is back on track  It can happen daily  He might snore a little bit but not as much as his wife  Does have the restless leg which too, and this disrupts sleep  He has a lot of fatigue    Work-up for memory loss  B12 594  TSH 0.23  MRI scan September 22, 2020 stable compared to 2005  EEG normal awake drowsy and sleep stage I record  Formal Willard cognitive assessment score 29 out of 30        He has some chronic lazy eye and double vision horizontally split but he does not think it is a lot worse    Still does not want to use a cane or walking stick    Talk to him about gait safety though as he has been climbing on ladders recommended that he not do this due to his poor balance    Since last seen  No hospitalizations no surgeries no major illnesses  No falls or injuries      Patient states his long as he is busy up and around moving his hips are too bad after he goes to sleep he wakes up in the morning there is stiffer and sore they limber up as he moves around    Does not really have radicular symptoms  Denies any new bowel  or bladder difficulty                  some workup in 2018 showin. B12 578, TSH 0.44.  2. MRI scan of the head with and without contrast scattered T2 signal FLAIR changes consistent with MS, but no active enhancement.  3. of the cervical spine, no cord lesions, but degenerative changes, see official report.  4. Thoracic spine MRI shows:  a. T8 6 mm cord lesion with equivocal enhancement.  b. T10 old demyelinating lesion.      1. EMG on 2018 of the lower extremities which was relatively normal.  2. History of back fusion five years ago.  3. Difficulty with his left hip and leg stiffening up, which I think is more from his multiple sclerosis.  4. Does use a little bit of baclofen but uses it more on a p.r.n. basis.      Patient s past history:    1. Multiple sclerosis diagnosed in  with LP and MRI, followed by Dr. Tru Snow, treated with Copaxone until 2017.  2. Had an anaphylactic type reaction to Copaxone sometime after being on the medication for almost 15 years.  3. Had a flare-up of paresthesias around the belly button or below in early or mid 2018 and has spastic legs chronically with some bladder urgency chronically.    Other past history:    1. Low lumbar pain, L3-L5 fusion in the past.  2. Eczema.  3. Restless legs syndrome.  4. Diverticulosis.  5. Carpal tunnel syndrome.    Habits  Patient does smoke a quarter pack of cigarettes per day,  Alcohol, might have two beers or so per week.    Family history is positive for:  Mother with breast cancer, may have gone to the bone.  Father is living at 88.  Four sisters relatively healthy.  One brother with MS.  Two other brothers relatively healthy.            Exam  Blood pressure 128/88, pulse 60    Review of systems  No headache no chest pain no shortness of breath no nausea vomiting no diarrhea no fever chills no sore throat no cough    No new focal weakness numbness or tingling  Does have the MS and a little bit  of a spastic gait  Wife previously had reported that he might snore but not as much as his wife  Does have some fatigue but could be from MS    Urination is okay  Bowel bladder adequate    From his MS might have some rare diplopia or blurry vision at the end of the day when he is tired mostly later in the day when he is watching TV he covers one eye    Swallowing okay  No dysphasia or dysarthria  Arms work relatively okay    Otherwise review systems are negative    General exam  Alert oriented x3  Neck supple  HEENT normal wears corrective lenses  Lungs clear  Heart rate regular  Abdomen soft  Symmetrical pulses  No edema the feet      Neurologic exam  Prosody of speech normal  Naming normal  Repetition normal  Comprehension normal  No aphasia  No neglect    Formal [place] cognitive assessment score 29 out of 30  He did use a memory technique though to remember the 5 words  Had difficulty with the visual spatial sequencing Trail    Able to talk about current events without difficulty    Cranial nerves II through XII significant for  Lazy eye but no significant ophthalmoplegia or nystagmus seen today  Visual fields intact  Talks on the side of his mouth baseline  Tongue twisters good  Visual fields intact face symmetrical    Upper extremities  No drift no tremor  With finger-nose maybe a little bit of incoordination subtle    Lower extremities  Mild increased tone bilaterally spastic  Negative straight leg raising sign  No actual weakness  Walks with a spastic type gait can you throws his hips out suddenly    Gait  Gets up from the chair without difficulty has a mildly spastic gait slightly wider base  Hesitancy  Spasticity in the gait           Assessment/Plan    Currently, he has:    1. Multiple sclerosis since 1999, off immunosuppression.  2. Failed Copaxone and beta interferons in the past.  3. Last exacerbation was probably in June 2018.  4. Mildly active lesion 6 mm cord lesion at T8 on MRI scan on July 12,  2018.  Follow-up MRI scan head September 22, 2020 similar to 2005 no active lesions  5. Restless legs syndrome, on Mirapex.  6. Spasticity on baclofen.  7. Rediscussed the Tecfidera.  8. Discussed that he needs to address the hypertension with his primary.  9. He wants to reassess his low back.    Recommend:    1. Consider seeing Ortho about the Hips   2. Continue Baclofen 10 mg tablet, two at night with two other tablets as rescue. He does say that it helped, but he does not use them often.  3. Mirapex 0.5 mg once at nighttime.  4. Mirapex 0.125 mg one tablet p.o. b.i.d., uses more p.r.n.  5. Vitamin D3 supplement.  6. have him reconsider going on immunosuppression.         MULTIPLE SCLEROSIS (G35)   History of multiple sclerosis since 1999 currently off immunosuppression  Failed Copaxone and beta interferons in the past, had passing out with 1 of the interferons      Have discussed Tecfidera in the past and he would like to hold off   Symptomatic medicine for treatment of his difficulties    Baclofen 10 mg tab 2 tabs p.o. twice daily usually just use the nighttime dose  Mirapex 0.5 mg at nighttime  Mirapex 0.125 mg 1 tablet p.o. twice daily uses more as needed   vitamin D 3 supplement    Currently off immunosuppression did not want to start new treatments         has new complaint of memory difficulty  We will check  B12 and thyroid testing normal  EEG normal  MRI scan September 22, 2020 compared to 2005 plaque load stable no active lesions    Holiday cog assessment score today 29 out of 30 did fairly well    Spasticity and leg difficulties are mainly from his cord plaque    We discussed immunosuppression with Tecfidera again today      Recent work-up  Labs September 17, 2020  TSH 0.23, B12 594  MRI scan brain September 22, 2020  T1 lesion load low  T2 lesion load moderate  No enhancing lesions  Similar plaque load compared to 2005 stable      If in the future memory is falling off consider looking at a sleep  study    Patient wants to hold off at this time    Today's visit was 30 minutes care time with greater than 50% face-to-face discussing his memory cognition and testing above patient will follow-up in August 2021

## 2021-05-27 ENCOUNTER — RECORDS - HEALTHEAST (OUTPATIENT)
Dept: ADMINISTRATIVE | Facility: CLINIC | Age: 61
End: 2021-05-27

## 2021-05-28 ENCOUNTER — RECORDS - HEALTHEAST (OUTPATIENT)
Dept: ADMINISTRATIVE | Facility: CLINIC | Age: 61
End: 2021-05-28

## 2021-05-29 ENCOUNTER — RECORDS - HEALTHEAST (OUTPATIENT)
Dept: ADMINISTRATIVE | Facility: CLINIC | Age: 61
End: 2021-05-29

## 2021-10-24 DIAGNOSIS — G35 MULTIPLE SCLEROSIS (H): ICD-10-CM

## 2021-10-25 RX ORDER — PRAMIPEXOLE DIHYDROCHLORIDE 0.5 MG/1
TABLET ORAL
Qty: 90 TABLET | Refills: 0 | Status: SHIPPED | OUTPATIENT
Start: 2021-10-25 | End: 2022-01-28

## 2021-10-25 NOTE — TELEPHONE ENCOUNTER
Refill request for pramipexole.  Pt of Dr. Rouse. Dr. Rouse is out of the office until 10/29/21. Can you refill in his absence?  Pt has upcoming appt on 1/31/22.  Medication T'd for review and signature    Faby Mao LPN on 10/25/2021 at 1:42 PM

## 2022-01-11 DIAGNOSIS — G35 MULTIPLE SCLEROSIS (H): ICD-10-CM

## 2022-01-11 RX ORDER — PRAMIPEXOLE DIHYDROCHLORIDE 0.12 MG/1
TABLET ORAL
Qty: 180 TABLET | Refills: 0 | Status: SHIPPED | OUTPATIENT
Start: 2022-01-11 | End: 2022-01-31

## 2022-01-11 NOTE — TELEPHONE ENCOUNTER
Refill request for Mirapex. Pt last seen 11/24/20 and has follow up scheduled for 1/31/22. Will send in refills to get pt through to appt.     Tiffanie Umana RN on 1/11/2022 at 12:50 PM

## 2022-01-27 DIAGNOSIS — G35 MULTIPLE SCLEROSIS (H): ICD-10-CM

## 2022-01-28 RX ORDER — PRAMIPEXOLE DIHYDROCHLORIDE 0.5 MG/1
TABLET ORAL
Qty: 90 TABLET | Refills: 0 | Status: SHIPPED | OUTPATIENT
Start: 2022-01-28 | End: 2022-01-31

## 2022-01-28 NOTE — TELEPHONE ENCOUNTER
Refill request for Mirapex. Pt last seen 11/24/20 and has follow up scheduled for 1/31/22. Will send in refill for pt.     Tiffanie Umana RN on 1/28/2022 at 8:10 AM

## 2022-01-31 ENCOUNTER — OFFICE VISIT (OUTPATIENT)
Dept: NEUROLOGY | Facility: CLINIC | Age: 62
End: 2022-01-31
Payer: MEDICARE

## 2022-01-31 VITALS
WEIGHT: 155 LBS | HEART RATE: 74 BPM | DIASTOLIC BLOOD PRESSURE: 76 MMHG | BODY MASS INDEX: 24.91 KG/M2 | SYSTOLIC BLOOD PRESSURE: 120 MMHG | HEIGHT: 66 IN

## 2022-01-31 DIAGNOSIS — G25.81 RESTLESS LEG SYNDROME: ICD-10-CM

## 2022-01-31 DIAGNOSIS — M54.16 LUMBAR RADICULOPATHY: ICD-10-CM

## 2022-01-31 DIAGNOSIS — G35 MULTIPLE SCLEROSIS (H): Primary | ICD-10-CM

## 2022-01-31 PROCEDURE — 99215 OFFICE O/P EST HI 40 MIN: CPT | Performed by: PSYCHIATRY & NEUROLOGY

## 2022-01-31 RX ORDER — PRAMIPEXOLE DIHYDROCHLORIDE 0.12 MG/1
0.12 TABLET ORAL 2 TIMES DAILY
Qty: 180 TABLET | Refills: 3 | Status: SHIPPED | OUTPATIENT
Start: 2022-01-31 | End: 2022-09-14

## 2022-01-31 RX ORDER — PRAMIPEXOLE DIHYDROCHLORIDE 0.5 MG/1
0.5 TABLET ORAL AT BEDTIME
Qty: 90 TABLET | Refills: 3 | Status: SHIPPED | OUTPATIENT
Start: 2022-01-31 | End: 2022-09-14

## 2022-01-31 RX ORDER — BACLOFEN 10 MG/1
10 TABLET ORAL 4 TIMES DAILY
Qty: 360 TABLET | Refills: 3 | Status: SHIPPED | OUTPATIENT
Start: 2022-01-31 | End: 2022-09-14

## 2022-01-31 RX ORDER — GABAPENTIN 100 MG/1
100 CAPSULE ORAL 3 TIMES DAILY
Qty: 270 CAPSULE | Refills: 3 | Status: SHIPPED | OUTPATIENT
Start: 2022-01-31 | End: 2022-09-14

## 2022-01-31 ASSESSMENT — MIFFLIN-ST. JEOR: SCORE: 1445.83

## 2022-01-31 NOTE — PROGRESS NOTES
In person evaluation      HPI  9/8/2020, video visit  11/24/2020, in person visit  1/31/2022, in person visit        In person evaluation neurologic consultation/follow-up in regards to MS/memory difficulties      61-year-old followed neurologically for:  Multiple sclerosis diagnosed sometime around 2000/1999  Spasticity and tightness of his legs  Spastic gait which aggravates his hips  Some restless leg type symptomatology  Past history of lumbar fusion L3-L5 in the past    Since last seen greater than a year ago  No hospitalizations  No surgeries  Did get the COVID vaccine but not the booster  Developed Covid illness October 4, 2021 very tired fatigued short of breath did not be hospitalized took 3 weeks to get better  Has some rhonchi type sounds on his right lung base was a smoker but I do not recall those in the past to follow-up with his primary to have his lungs checked    Has difficulty with burning dysesthesias in his hips more at nighttime  Not aggravated by walking around  Did get a cortisone injection in the past but it did not really help    Has a restless leg gets more so on the right leg and the left  Since he has to increase the Mirapex 0.125 mg tablet 1 to 2/day on top of the 0.5 mg tablet    We discussed immune modulation medications prefers not to go on any at this time      A.  Multiple sclerosis diagnosed around 1999/2000       Spasticity and tightness in his legs       Spasticity aggravates his gait and hips          Has had difficulty with side effects from medicines and actually passed out with 1 of the injectable medicine so Gilenya would not be a good choice for him        Have discussed Tecfidera             He has some chronic lazy eye and double vision horizontally split but he does not think it is a lot worse         Still does not want to use a cane or walking stick         Talk to him about gait safety though as he has been climbing on ladders recommended that he not do this due to  his poor balance         Does not really have radicular symptoms          Denies any new bowel or bladder difficulty      B.  Restless leg syndrome        Worse in the right leg        More of a burning dysesthesias in his hip unclear if this is related to the restless leg or the MS plaque in his cervical spine    C.   Memory/attention difficulties         Initial evaluation November 2020         Wife confirms that he will forget things in the middle of a conversation with a slight hand he is back on track          It can happen daily          Work-up for memory loss        B12 594, TSH 0.23        MRI scan September 22, 2020 stable compared to 2005        EEG normal awake drowsy and sleep stage I record        Formal Willard cognitive assessment score 29 out of 30      D.  Disrupted sleep from his restless leg        History of chronic fatigue with his MS        Does snore some but not significant symptoms of sleep apnea                Patient s past history:  Multiple sclerosis diagnosed in 2000/1999 with LP and MRI, followed by Dr. Tru Snow, treated with Copaxone until October 2017.     A.  Had an anaphylactic type reaction to Copaxone sometime after being on the medication for almost 15 years.     B.  Had a flare-up of paresthesias around the belly button or below in early or mid June 2018 and has spastic legs chronically with some bladder urgency chronically.  Low lumbar pain, L3-L5 fusion in the past.  2014 Dr. Verma  Eczema.  Restless legs syndrome.  Diverticulosis.  Carpal tunnel syndrome.    Habits  Patient does smoke a quarter pack of cigarettes per day,  Alcohol, might have two beers or so per week.    Family history is positive for:  Mother with breast cancer, may have gone to the bone.  Father is living at 88.  Four sisters relatively healthy.  One brother with MS.  Two other brothers relatively healthy.      some workup in July 2018 showing:  B12 578, TSH 0.44.  July 2018  MRI scan of  the head with and without contrast, July 2018, scattered T2 signal FLAIR changes consistent with MS, but no active enhancement.  of the cervical spine, no cord lesions, but degenerative changes, see official report.  Thoracic spine MRI shows: July 2018  a. T8 6 mm cord lesion with equivocal enhancement.  b. T10 old demyelinating lesion.  EMG on October 24, 2018 of the lower extremities which was relatively normal.        Work-up for memory loss 2020  Labs September 17, 2020  TSH 0.23, B12 594  MRI scan brain September 22, 2020  T1 lesion load low  T2 lesion load moderate  No enhancing lesions  Similar plaque load compared to 2005 stable  EEG normal awake drowsy and sleep stage I record  Formal Willard cognitive assessment score 29 out of 30 (November 24, 2020)        Exam    Review of system  No headache no chest pain or shortness of breath no nausea vomiting no diarrhea no fever chills    No dysphagia or dysarthria  Can have some rare diplopia versus blurry vision at the end of the day when he is tired noticed more so when he is watching TV covers 1 eye better    Does have some snoring  Has difficulty with some fatigue chronic from his MS    Has some difficulty with spastic gait chronic    No new focal weakness numbness or tingling    Urination okay  Bladder control adequate    Otherwise review of systems negative    General exam  Vital signs reviewed document in chart  HEENT normal does wear corrective lenses  Lungs right base with some rhonchi otherwise clear new since he had Covid  Heart rate regular  Abdomen soft  Symmetrical pulses  No edema the feet  Straight leg raising sign negative   Range of motion of the hips normal without pain    Neurologic exam  Alert oriented x3  Prosody speech normal  Naming normal  Repetition normal  Comprehension normal  No aphasia  No neglect  Memory okay  (MoCA 29 out of 30, November 2020), had some mild difficulty with sequencing visual spatial    Cranial 2 through 12  significant for  Chronic lazy eye  No obvious ophthalmoplegia  No nystagmus  Visual fields intact  Talks outside of his mouth chronic baseline  Tongue twisters good  No Stuart Massimo phenomenon  No red desaturation    Upper extremities  No drift  No tremor  Finger-nose question subtle incoordination    Lower extremities  Mild increased tone bilaterally slightly spastic  Neck straight leg raising sign  No actual weakness    Gait  Gets up from the chair without difficulty  Slightly wider base stance  As a tightness to his legs with the hesitancy spastic gait               Assessment/Plan    1.  MULTIPLE SCLEROSIS (G35)        History of multiple sclerosis since 1999 currently off immunosuppression       Failed Copaxone and beta interferons in the past, had passing out with 1 of the interferons          Have discussed Tecfidera in the past and he would like to hold off        Symptomatic medicine for treatment of his difficulties    Baclofen 10 mg tab 1 tab 4 times daily adjust the times of dosing depending on what he needs  Mirapex 0.5 mg at nighttime  Mirapex 0.125 mg 1 tablet p.o. twice daily uses more as needed   vitamin D 3 supplement    Currently off of immunosuppression did not want to start any new treatments did rediscuss Tecfidera      Add Neurontin 100 mg capsule 3 times daily as needed can use closer to nighttime for the burning dysesthesias in his hip  Currently off immunosuppression did not want to start new treatments      2.   Memory difficulty        Work-up 2020 unremarkable, MRI scan head stable, B12 TSH normal, EEG normal,        MoCA 29 out of 30, (November 24, 2020)    3.  Gait difficulty       Spasticity and leg difficulties are mainly from his cord plaque      Currently, he has:  1. Multiple sclerosis since 1999, off immunosuppression.  2. Failed Copaxone and beta interferons in the past.  3. Last exacerbation was probably in June 2018.  4. Mildly active lesion 6 mm cord lesion at T8 on MRI scan  on July 12, 2018.  Follow-up MRI scan head September 22, 2020 similar to 2005 no active lesions  5. Restless legs syndrome, on Mirapex.  6. Spasticity on baclofen.  7. Rediscussed the Tecfidera.  8.  Restless leg syndrome/burning dysesthesias in the hip    Recommend:  Consider seeing Ortho about the Hips   Continue Baclofen 10 mg tablet, 4 times daily  Mirapex 0.5 mg once at nighttime.  Mirapex 0.125 mg one tablet p.o. b.i.d., uses more p.r.n. using a little bit more regularly now  Vitamin D3 supplement.      Add Neurontin heterograft capsule ordered 3 times daily but will use closer to nighttime for his burning dysesthesias in the hip      If in the future memory is falling off consider looking at a sleep study  Patient wants to hold off at this time  Increased fatigue since having Covid should follow-up with his primary    Total care time today 42 minutes discussing and evaluating multiple difficulties above  Multiple sclerosis  Restless legs  Burning dysesthesias in the hip  Fatigue

## 2022-01-31 NOTE — NURSING NOTE
Chief Complaint   Patient presents with     Multiple Sclerosis     Annual follow up. Pt states his having pain in bilat legs and hips.      Faby Mao LPN on 1/31/2022 at 1:09 PM

## 2022-01-31 NOTE — LETTER
1/31/2022         RE: Tim Driscoll  [id number]  [facility] [id number]        Dear Colleague,    Thank you for referring your patient, Tim Driscoll, to the [facility] [place]. Please see a copy of my visit note below.    In person evaluation      HPI  9/8/2020, video visit  11/24/2020, in person visit  1/31/2022, in person visit        In person evaluation neurologic consultation/follow-up in regards to MS/memory difficulties      61-year-old followed neurologically for:  Multiple sclerosis diagnosed sometime around 2000/1999  Spasticity and tightness of his legs  Spastic gait which aggravates his hips  Some restless leg type symptomatology  Past history of lumbar fusion L3-L5 in the past    Since last seen greater than a year ago  No hospitalizations  No surgeries  Did get the COVID vaccine but not the booster  Developed Covid illness October 4, 2021 very tired fatigued short of breath did not be hospitalized took 3 weeks to get better  Has some rhonchi type sounds on his right lung base was a smoker but I do not recall those in the past to follow-up with his primary to have his lungs checked    Has difficulty with burning dysesthesias in his hips more at nighttime  Not aggravated by walking around  Did get a cortisone injection in the past but it did not really help    Has a restless leg gets more so on the right leg and the left  Since he has to increase the Mirapex 0.125 mg tablet 1 to 2/day on top of the 0.5 mg tablet    We discussed immune modulation medications prefers not to go on any at this time      A.  Multiple sclerosis diagnosed around 1999/2000       Spasticity and tightness in his legs       Spasticity aggravates his gait and hips          Has had difficulty with side effects from medicines and actually passed out with 1 of the injectable medicine so Gilenya would not be a good choice for him        Have discussed Tecfidera             He has some  chronic lazy eye and double vision horizontally split but he does not think it is a lot worse         Still does not want to use a cane or walking stick         Talk to him about gait safety though as he has been climbing on ladders recommended that he not do this due to his poor balance         Does not really have radicular symptoms          Denies any new bowel or bladder difficulty      B.  Restless leg syndrome        Worse in the right leg        More of a burning dysesthesias in his hip unclear if this is related to the restless leg or the MS plaque in his cervical spine    C.   Memory/attention difficulties         Initial evaluation November 2020         Wife confirms that he will forget things in the middle of a conversation with a slight hand he is back on track          It can happen daily          Work-up for memory loss        B12 594, TSH 0.23        MRI scan September 22, 2020 stable compared to 2005        EEG normal awake drowsy and sleep stage I record        Formal Willard cognitive assessment score 29 out of 30      D.  Disrupted sleep from his restless leg        History of chronic fatigue with his MS        Does snore some but not significant symptoms of sleep apnea                Patient s past history:  Multiple sclerosis diagnosed in 2000/1999 with LP and MRI, followed by Dr. Tru Snow, treated with Copaxone until October 2017.     A.  Had an anaphylactic type reaction to Copaxone sometime after being on the medication for almost 15 years.     B.  Had a flare-up of paresthesias around the belly button or below in early or mid June 2018 and has spastic legs chronically with some bladder urgency chronically.  Low lumbar pain, L3-L5 fusion in the past.  2014 Dr. Verma  Eczema.  Restless legs syndrome.  Diverticulosis.  Carpal tunnel syndrome.    Habits  Patient does smoke a quarter pack of cigarettes per day,  Alcohol, might have two beers or so per week.    Family history is  positive for:  Mother with breast cancer, may have gone to the bone.  Father is living at 88.  Four sisters relatively healthy.  One brother with MS.  Two other brothers relatively healthy.      some workup in July 2018 showing:  B12 578, TSH 0.44.  July 2018  MRI scan of the head with and without contrast, July 2018, scattered T2 signal FLAIR changes consistent with MS, but no active enhancement.  of the cervical spine, no cord lesions, but degenerative changes, see official report.  Thoracic spine MRI shows: July 2018  a. T8 6 mm cord lesion with equivocal enhancement.  b. T10 old demyelinating lesion.  EMG on October 24, 2018 of the lower extremities which was relatively normal.        Work-up for memory loss 2020  Labs September 17, 2020  TSH 0.23, B12 594  MRI scan brain September 22, 2020  T1 lesion load low  T2 lesion load moderate  No enhancing lesions  Similar plaque load compared to 2005 stable  EEG normal awake drowsy and sleep stage I record  Formal Willard cognitive assessment score 29 out of 30 (November 24, 2020)        Exam    Review of system  No headache no chest pain or shortness of breath no nausea vomiting no diarrhea no fever chills    No dysphagia or dysarthria  Can have some rare diplopia versus blurry vision at the end of the day when he is tired noticed more so when he is watching TV covers 1 eye better    Does have some snoring  Has difficulty with some fatigue chronic from his MS    Has some difficulty with spastic gait chronic    No new focal weakness numbness or tingling    Urination okay  Bladder control adequate    Otherwise review of systems negative    General exam  Vital signs reviewed document in chart  HEENT normal does wear corrective lenses  Lungs right base with some rhonchi otherwise clear new since he had Covid  Heart rate regular  Abdomen soft  Symmetrical pulses  No edema the feet  Straight leg raising sign negative   Range of motion of the hips normal without  pain    Neurologic exam  Alert oriented x3  Prosody speech normal  Naming normal  Repetition normal  Comprehension normal  No aphasia  No neglect  Memory okay  (MoCA 29 out of 30, November 2020), had some mild difficulty with sequencing visual spatial    Cranial 2 through 12 significant for  Chronic lazy eye  No obvious ophthalmoplegia  No nystagmus  Visual fields intact  Talks outside of his mouth chronic baseline  Tongue twisters good  No Stuart Massimo phenomenon  No red desaturation    Upper extremities  No drift  No tremor  Finger-nose question subtle incoordination    Lower extremities  Mild increased tone bilaterally slightly spastic  Neck straight leg raising sign  No actual weakness    Gait  Gets up from the chair without difficulty  Slightly wider base stance  As a tightness to his legs with the hesitancy spastic gait               Assessment/Plan    1.  MULTIPLE SCLEROSIS (G35)        History of multiple sclerosis since 1999 currently off immunosuppression       Failed Copaxone and beta interferons in the past, had passing out with 1 of the interferons          Have discussed Tecfidera in the past and he would like to hold off        Symptomatic medicine for treatment of his difficulties    Baclofen 10 mg tab 1 tab 4 times daily adjust the times of dosing depending on what he needs  Mirapex 0.5 mg at nighttime  Mirapex 0.125 mg 1 tablet p.o. twice daily uses more as needed   vitamin D 3 supplement    Currently off of immunosuppression did not want to start any new treatments did rediscuss Tecfidera      Add Neurontin 100 mg capsule 3 times daily as needed can use closer to nighttime for the burning dysesthesias in his hip  Currently off immunosuppression did not want to start new treatments      2.   Memory difficulty        Work-up 2020 unremarkable, MRI scan head stable, B12 TSH normal, EEG normal,        MoCA 29 out of 30, (November 24, 2020)    3.  Gait difficulty       Spasticity and leg difficulties  are mainly from his cord plaque      Currently, he has:  1. Multiple sclerosis since 1999, off immunosuppression.  2. Failed Copaxone and beta interferons in the past.  3. Last exacerbation was probably in June 2018.  4. Mildly active lesion 6 mm cord lesion at T8 on MRI scan on July 12, 2018.  Follow-up MRI scan head September 22, 2020 similar to 2005 no active lesions  5. Restless legs syndrome, on Mirapex.  6. Spasticity on baclofen.  7. Rediscussed the Tecfidera.  8.  Restless leg syndrome/burning dysesthesias in the hip    Recommend:  Consider seeing Ortho about the Hips   Continue Baclofen 10 mg tablet, 4 times daily  Mirapex 0.5 mg once at nighttime.  Mirapex 0.125 mg one tablet p.o. b.i.d., uses more p.r.n. using a little bit more regularly now  Vitamin D3 supplement.      Add Neurontin heterograft capsule ordered 3 times daily but will use closer to nighttime for his burning dysesthesias in the hip      If in the future memory is falling off consider looking at a sleep study  Patient wants to hold off at this time  Increased fatigue since having Covid should follow-up with his primary    Total care time today 42 minutes discussing and evaluating multiple difficulties above  Multiple sclerosis  Restless legs  Burning dysesthesias in the hip  Fatigue              Again, thank you for allowing me to participate in the care of your patient.        Sincerely,        Tim Rouse MD

## 2022-04-30 DIAGNOSIS — G35 MULTIPLE SCLEROSIS (H): ICD-10-CM

## 2022-05-02 RX ORDER — PRAMIPEXOLE DIHYDROCHLORIDE 0.5 MG/1
TABLET ORAL
Qty: 90 TABLET | Refills: 0 | OUTPATIENT
Start: 2022-05-02

## 2022-05-02 NOTE — TELEPHONE ENCOUNTER
Refill request for Mirapex. Pt last seen 1/31/22 and has follow up scheduled for 9/14/22. At last visit pt was sent a 90 day supply with 3 refills. Will deny for refills on file.     Tiffanie Umana RN on 5/2/2022 at 10:23 AM

## 2022-09-13 NOTE — PROGRESS NOTES
In person evaluation      HPI  9/8/2020, video visit  11/24/2020, in person visit  1/31/2022, in person visit  9/14/2022, in person visit      62-year-old followed neurologically for:  Multiple sclerosis diagnosed sometime around 2000/1999  Spasticity and tightness of his legs  Spastic gait which aggravates his hips  Some restless leg type symptomatology  Past history of lumbar fusion L3-L5 in the past        Since last seen  No hospitalizations  No surgeries  No major illnesses  No falls or injuries    Restless legs stable  Adjust his short acting Mirapex if he is going to be on a long car ride or sits for a long time.    Uses a low-dose Neurontin 100 mg for dysesthesias and burning in his legs  Is allowed to go up to a total of 3 tabs in a day    Is on the baclofen for spasticity  As a wide-based stance and gait but does not use an assistive device no falls talked about gait safety    We discussed the use of immune modulating medications to decrease flareups he prefers to wait on that    Summertime the heat does make his legs a little bit worse  In the winter he has a pole barn that he did does woodwork out there cold bothers him a little bit but not like the heat humidity    No diplopia dysarthria dysphagia no visual changes      Has difficulty with burning dysesthesias in his hips more at nighttime  Not aggravated by walking around  Did get a cortisone injection in the past but it did not really help          A.  Multiple sclerosis diagnosed around 1999/2000       Spasticity and tightness in his legs       Spasticity aggravates his gait and hips          Has had difficulty with side effects from medicines and actually passed out with 1 of the injectable medicine so Gilenya would not be a good choice for him        Have discussed Tecfidera        We discussed immune modulation medications prefers not to go on any at this time             He has some chronic lazy eye and double vision horizontally split but he  does not think it is a lot worse         Still does not want to use a cane or walking stick         Talk to him about gait safety though as he has been climbing on ladders recommended that he not do this due to his poor balance         Does not really have radicular symptoms          Denies any new bowel or bladder difficulty      B.  Restless leg syndrome        Worse in the right leg        More of a burning dysesthesias in his hip unclear if this is related to the restless leg or the MS plaque in his cervical spine        Has a restless leg gets more so on the right leg and the left         Mirapex 0.125 mg tablet 1 - 2/day on top of the 0.5 mg tablet      C.   Memory/attention difficulties         Initial evaluation November 2020         Wife confirms that he will forget things in the middle of a conversation with a slight hand he is back on track          It can happen daily          Work-up for memory loss        B12 594, TSH 0.23        MRI scan September 22, 2020 stable compared to 2005        EEG normal awake drowsy and sleep stage I record        Formal [place] cognitive assessment score 29 out of 30      D.  Disrupted sleep from his restless leg        History of chronic fatigue with his MS        Does snore some but not significant symptoms of sleep apnea      E.  COVID illness October 4, 2021       Did get the COVID vaccine but not the booster       Developed Covid illness October 4, 2021 very tired fatigued short of breath did not be hospitalized took 3 weeks to get better       Has some rhonchi type sounds on his right lung base was a smoker but I do not recall those in the past        to follow-up with his primary to have his lungs checked      F.    L3-5 lumbar fusion 2014 by Dr. Nayana Verma         Did get a cortisone injection in the past (2021), but it did not really help        Patient s past history:  Multiple sclerosis diagnosed in 2000/1999 with LP and MRI, followed by Dr. Ott  Gwendolyn, treated with Copaxone until October 2017.     A.  Had an anaphylactic type reaction to Copaxone sometime after being on the medication for almost 15 years.     B.  Had a flare-up of paresthesias around the belly button or below in early or mid June 2018 and has spastic legs chronically with some bladder urgency chronically.  Low lumbar pain, L3-L5 fusion in the past.  2014 Dr. Verma  Eczema.  Restless legs syndrome.  Diverticulosis.  Carpal tunnel syndrome.  Hyperlipidemia  Chronic bronchitis      Habits  Patient does smoke a quarter pack of cigarettes per day,  Alcohol, might have two beers or so per week.    Family history is positive for:  Mother with breast cancer, may have gone to the bone.  Father is living at 88.  Four sisters relatively healthy.  One brother with MS.  Two other brothers relatively healthy.      some workup in July 2018 showing:  B12 578, TSH 0.44.  July 2018  MRI scan of the head with and without contrast, July 2018, scattered T2 signal FLAIR changes consistent with MS, but no active enhancement.  of the cervical spine, no cord lesions, but degenerative changes, see official report.  Thoracic spine MRI shows: July 2018  a. T8 6 mm cord lesion with equivocal enhancement.  b. T10 old demyelinating lesion.  EMG on October 24, 2018 of the lower extremities which was relatively normal.        Work-up for memory loss 2020  Labs September 17, 2020  TSH 0.23, B12 594  MRI scan brain September 22, 2020  T1 lesion load low  T2 lesion load moderate  No enhancing lesions  Similar plaque load compared to 2005 stable  EEG normal awake drowsy and sleep stage I record    MoCA    11/24/2020,           29 out of 30            Exam    Review of system  No headache no chest pain or shortness of breath no nausea vomiting no diarrhea no fever chills    No dysphagia or dysarthria  Can have some rare diplopia versus blurry vision at the end of the day when he is tired noticed more so when he is  watching TV covers 1 eye better    Does have some snoring  Has difficulty with some fatigue chronic from his MS    Has some difficulty with spastic gait chronic    No new focal weakness numbness or tingling    Urination okay  Bladder control adequate    Otherwise review of systems negative    General exam  Blood pressure 125/75, pulse 71  HEENT normal does wear corrective lenses  Lungs lungs clear today  Heart rate regular  Abdomen soft  Symmetrical pulses  No edema the feet  Straight leg raising sign negative   Range of motion of the hips normal without pain    Neurologic exam  Alert oriented x3  Prosody speech normal  Naming normal  Repetition normal  Comprehension normal  No aphasia  No neglect  Memory okay  (MoCA 29 out of 30, November 2020), had some mild difficulty with sequencing visual spatial    Cranial 2 through 12 significant for  Chronic lazy eye  No obvious ophthalmoplegia  No nystagmus  Visual fields intact  Talks outside of his mouth chronic baseline  Tongue twisters good  No Stuart Massimo phenomenon  No red desaturation    Upper extremities  No drift  No tremor  Finger-nose question subtle incoordination    Lower extremities  Mild increased tone bilaterally slightly spastic  Neck straight leg raising sign  No actual weakness    Gait  Gets up from the chair without difficulty  Slightly wider base stance  As a tightness to his legs with the hesitancy spastic gait    Very subtle positive Romberg has his legs far apart             Assessment/Plan    1.  MULTIPLE SCLEROSIS (G35)        History of multiple sclerosis since 1999 currently off immunosuppression       Failed Copaxone and beta interferons in the past, had passing out with 1 of the interferons          Have discussed Tecfidera in the past and he would like to hold off        Symptomatic medicine for treatment of his difficulties    Baclofen 10 mg tab 1 tab 4 times daily adjust the times of dosing depending on what he needs  Mirapex 0.5 mg at  nighttime  Mirapex 0.125 mg 1 tablet p.o. twice daily uses more as needed   vitamin D 3 supplement    Currently off of immunosuppression did not want to start any new treatments did rediscuss Tecfidera      Neurontin 100 mg capsule 3 times daily as needed can use closer to nighttime for the burning dysesthesias in his hip          2.   Memory difficulty        Work-up 2020 unremarkable, MRI scan head stable, B12 TSH normal, EEG normal,        MoCA 29 out of 30, (November 24, 2020)    3.  Gait difficulty       Spasticity and leg difficulties are mainly from his cord plaque      Currently, he has:  1. Multiple sclerosis since 1999, off immunosuppression.  2. Failed Copaxone and beta interferons in the past.  3. Last exacerbation was probably in June 2018.  4. Mildly active lesion 6 mm cord lesion at T8 on MRI scan on July 12, 2018.  Follow-up MRI scan head September 22, 2020 similar to 2005 no active lesions  5. Restless legs syndrome, on Mirapex.  6. Spasticity on baclofen.  7. Rediscussed the Tecfidera.  8.  Restless leg syndrome/burning dysesthesias in the hip    Recommend:  Consider seeing Ortho about the Hips   Continue Baclofen 10 mg tablet, 4 times daily  Mirapex 0.5 mg once at nighttime.  Mirapex 0.125 mg one tablet p.o. b.i.d., uses more p.r.n. using a little bit more regularly now  Vitamin D3 supplement.    Neurontin 100 capsule ordered 3 times daily but will use closer to nighttime for his burning dysesthesias in the hip      If in the future memory is falling off consider looking at a sleep study  Patient wants to hold off at this time      Total care time today 34 minutes discussing multiple topics  Multiple sclerosis  Restless legs  Burning dysesthesias in the hip  Fatigue    Patient will follow-up on a yearly basis  Meds refilled and reviewed

## 2022-09-14 ENCOUNTER — OFFICE VISIT (OUTPATIENT)
Dept: NEUROLOGY | Facility: CLINIC | Age: 62
End: 2022-09-14
Payer: MEDICARE

## 2022-09-14 VITALS
BODY MASS INDEX: 25.97 KG/M2 | SYSTOLIC BLOOD PRESSURE: 125 MMHG | WEIGHT: 161.6 LBS | HEART RATE: 71 BPM | HEIGHT: 66 IN | DIASTOLIC BLOOD PRESSURE: 75 MMHG

## 2022-09-14 DIAGNOSIS — R41.3 MEMORY LOSS: ICD-10-CM

## 2022-09-14 DIAGNOSIS — G25.81 RESTLESS LEG SYNDROME: ICD-10-CM

## 2022-09-14 DIAGNOSIS — G35 MULTIPLE SCLEROSIS (H): Primary | ICD-10-CM

## 2022-09-14 DIAGNOSIS — M54.16 LUMBAR RADICULOPATHY: ICD-10-CM

## 2022-09-14 PROCEDURE — 99214 OFFICE O/P EST MOD 30 MIN: CPT | Performed by: PSYCHIATRY & NEUROLOGY

## 2022-09-14 RX ORDER — PRAMIPEXOLE DIHYDROCHLORIDE 0.12 MG/1
0.12 TABLET ORAL 2 TIMES DAILY
Qty: 180 TABLET | Refills: 3 | Status: SHIPPED | OUTPATIENT
Start: 2022-09-14 | End: 2023-09-18

## 2022-09-14 RX ORDER — BACLOFEN 10 MG/1
10 TABLET ORAL 4 TIMES DAILY
Qty: 360 TABLET | Refills: 3 | Status: SHIPPED | OUTPATIENT
Start: 2022-09-14 | End: 2023-10-17

## 2022-09-14 RX ORDER — GABAPENTIN 100 MG/1
100 CAPSULE ORAL 3 TIMES DAILY
Qty: 270 CAPSULE | Refills: 3 | Status: SHIPPED | OUTPATIENT
Start: 2022-09-14 | End: 2023-09-18

## 2022-09-14 RX ORDER — PRAMIPEXOLE DIHYDROCHLORIDE 0.5 MG/1
0.5 TABLET ORAL AT BEDTIME
Qty: 90 TABLET | Refills: 3 | Status: SHIPPED | OUTPATIENT
Start: 2022-09-14 | End: 2023-09-18

## 2022-09-14 NOTE — NURSING NOTE
Chief Complaint   Patient presents with     MS     Follow up      Rocky Méndez CMA@ on 9/14/2022 at 10:36 AM

## 2022-09-14 NOTE — LETTER
9/14/2022         RE: Tim Driscoll  [id number]  [facility] [id number]        Dear Colleague,    Thank you for referring your patient, Tim Driscoll, to the [facility] [place]. Please see a copy of my visit note below.    In person evaluation      HPI  9/8/2020, video visit  11/24/2020, in person visit  1/31/2022, in person visit  9/14/2022, in person visit      62-year-old followed neurologically for:  Multiple sclerosis diagnosed sometime around 2000/1999  Spasticity and tightness of his legs  Spastic gait which aggravates his hips  Some restless leg type symptomatology  Past history of lumbar fusion L3-L5 in the past        Since last seen  No hospitalizations  No surgeries  No major illnesses  No falls or injuries    Restless legs stable  Adjust his short acting Mirapex if he is going to be on a long car ride or sits for a long time.    Uses a low-dose Neurontin 100 mg for dysesthesias and burning in his legs  Is allowed to go up to a total of 3 tabs in a day    Is on the baclofen for spasticity  As a wide-based stance and gait but does not use an assistive device no falls talked about gait safety    We discussed the use of immune modulating medications to decrease flareups he prefers to wait on that    Summertime the heat does make his legs a little bit worse  In the winter he has a pole barn that he did does woodwork out there cold bothers him a little bit but not like the heat humidity    No diplopia dysarthria dysphagia no visual changes      Has difficulty with burning dysesthesias in his hips more at nighttime  Not aggravated by walking around  Did get a cortisone injection in the past but it did not really help          A.  Multiple sclerosis diagnosed around 1999/2000       Spasticity and tightness in his legs       Spasticity aggravates his gait and hips          Has had difficulty with side effects from medicines and actually passed out with 1 of the  injectable medicine so Gilenya would not be a good choice for him        Have discussed Tecfidera        We discussed immune modulation medications prefers not to go on any at this time             He has some chronic lazy eye and double vision horizontally split but he does not think it is a lot worse         Still does not want to use a cane or walking stick         Talk to him about gait safety though as he has been climbing on ladders recommended that he not do this due to his poor balance         Does not really have radicular symptoms          Denies any new bowel or bladder difficulty      B.  Restless leg syndrome        Worse in the right leg        More of a burning dysesthesias in his hip unclear if this is related to the restless leg or the MS plaque in his cervical spine        Has a restless leg gets more so on the right leg and the left         Mirapex 0.125 mg tablet 1 - 2/day on top of the 0.5 mg tablet      C.   Memory/attention difficulties         Initial evaluation November 2020         Wife confirms that he will forget things in the middle of a conversation with a slight hand he is back on track          It can happen daily          Work-up for memory loss        B12 594, TSH 0.23        MRI scan September 22, 2020 stable compared to 2005        EEG normal awake drowsy and sleep stage I record        Formal [place] cognitive assessment score 29 out of 30      D.  Disrupted sleep from his restless leg        History of chronic fatigue with his MS        Does snore some but not significant symptoms of sleep apnea      E.  COVID illness October 4, 2021       Did get the COVID vaccine but not the booster       Developed Covid illness October 4, 2021 very tired fatigued short of breath did not be hospitalized took 3 weeks to get better       Has some rhonchi type sounds on his right lung base was a smoker but I do not recall those in the past        to follow-up with his primary to have his lungs  checked      F.    L3-5 lumbar fusion 2014 by Dr. Nayana Verma         Did get a cortisone injection in the past (2021), but it did not really help        Patient s past history:  Multiple sclerosis diagnosed in 2000/1999 with LP and MRI, followed by Dr. Tru Snow, treated with Copaxone until October 2017.     A.  Had an anaphylactic type reaction to Copaxone sometime after being on the medication for almost 15 years.     B.  Had a flare-up of paresthesias around the belly button or below in early or mid June 2018 and has spastic legs chronically with some bladder urgency chronically.  Low lumbar pain, L3-L5 fusion in the past.  2014 Dr. Verma  Eczema.  Restless legs syndrome.  Diverticulosis.  Carpal tunnel syndrome.  Hyperlipidemia  Chronic bronchitis      Habits  Patient does smoke a quarter pack of cigarettes per day,  Alcohol, might have two beers or so per week.    Family history is positive for:  Mother with breast cancer, may have gone to the bone.  Father is living at 88.  Four sisters relatively healthy.  One brother with MS.  Two other brothers relatively healthy.      some workup in July 2018 showing:  B12 578, TSH 0.44.  July 2018  MRI scan of the head with and without contrast, July 2018, scattered T2 signal FLAIR changes consistent with MS, but no active enhancement.  of the cervical spine, no cord lesions, but degenerative changes, see official report.  Thoracic spine MRI shows: July 2018  a. T8 6 mm cord lesion with equivocal enhancement.  b. T10 old demyelinating lesion.  EMG on October 24, 2018 of the lower extremities which was relatively normal.        Work-up for memory loss 2020  Labs September 17, 2020  TSH 0.23, B12 594  MRI scan brain September 22, 2020  T1 lesion load low  T2 lesion load moderate  No enhancing lesions  Similar plaque load compared to 2005 stable  EEG normal awake drowsy and sleep stage I record    MoCA    11/24/2020,           29 out of  30            Exam    Review of system  No headache no chest pain or shortness of breath no nausea vomiting no diarrhea no fever chills    No dysphagia or dysarthria  Can have some rare diplopia versus blurry vision at the end of the day when he is tired noticed more so when he is watching TV covers 1 eye better    Does have some snoring  Has difficulty with some fatigue chronic from his MS    Has some difficulty with spastic gait chronic    No new focal weakness numbness or tingling    Urination okay  Bladder control adequate    Otherwise review of systems negative    General exam  Blood pressure 125/75, pulse 71  HEENT normal does wear corrective lenses  Lungs lungs clear today  Heart rate regular  Abdomen soft  Symmetrical pulses  No edema the feet  Straight leg raising sign negative   Range of motion of the hips normal without pain    Neurologic exam  Alert oriented x3  Prosody speech normal  Naming normal  Repetition normal  Comprehension normal  No aphasia  No neglect  Memory okay  (MoCA 29 out of 30, November 2020), had some mild difficulty with sequencing visual spatial    Cranial 2 through 12 significant for  Chronic lazy eye  No obvious ophthalmoplegia  No nystagmus  Visual fields intact  Talks outside of his mouth chronic baseline  Tongue twisters good  No Stuart Massimo phenomenon  No red desaturation    Upper extremities  No drift  No tremor  Finger-nose question subtle incoordination    Lower extremities  Mild increased tone bilaterally slightly spastic  Neck straight leg raising sign  No actual weakness    Gait  Gets up from the chair without difficulty  Slightly wider base stance  As a tightness to his legs with the hesitancy spastic gait    Very subtle positive Romberg has his legs far apart             Assessment/Plan    1.  MULTIPLE SCLEROSIS (G35)        History of multiple sclerosis since 1999 currently off immunosuppression       Failed Copaxone and beta interferons in the past, had passing out  with 1 of the interferons          Have discussed Tecfidera in the past and he would like to hold off        Symptomatic medicine for treatment of his difficulties    Baclofen 10 mg tab 1 tab 4 times daily adjust the times of dosing depending on what he needs  Mirapex 0.5 mg at nighttime  Mirapex 0.125 mg 1 tablet p.o. twice daily uses more as needed   vitamin D 3 supplement    Currently off of immunosuppression did not want to start any new treatments did rediscuss Tecfidera      Neurontin 100 mg capsule 3 times daily as needed can use closer to nighttime for the burning dysesthesias in his hip          2.   Memory difficulty        Work-up 2020 unremarkable, MRI scan head stable, B12 TSH normal, EEG normal,        MoCA 29 out of 30, (November 24, 2020)    3.  Gait difficulty       Spasticity and leg difficulties are mainly from his cord plaque      Currently, he has:  1. Multiple sclerosis since 1999, off immunosuppression.  2. Failed Copaxone and beta interferons in the past.  3. Last exacerbation was probably in June 2018.  4. Mildly active lesion 6 mm cord lesion at T8 on MRI scan on July 12, 2018.  Follow-up MRI scan head September 22, 2020 similar to 2005 no active lesions  5. Restless legs syndrome, on Mirapex.  6. Spasticity on baclofen.  7. Rediscussed the Tecfidera.  8.  Restless leg syndrome/burning dysesthesias in the hip    Recommend:  Consider seeing Ortho about the Hips   Continue Baclofen 10 mg tablet, 4 times daily  Mirapex 0.5 mg once at nighttime.  Mirapex 0.125 mg one tablet p.o. b.i.d., uses more p.r.n. using a little bit more regularly now  Vitamin D3 supplement.    Neurontin 100 capsule ordered 3 times daily but will use closer to nighttime for his burning dysesthesias in the hip      If in the future memory is falling off consider looking at a sleep study  Patient wants to hold off at this time      Total care time today 34 minutes discussing multiple topics  Multiple sclerosis  Restless  legs  Burning dysesthesias in the hip  Fatigue    Patient will follow-up on a yearly basis  Meds refilled and reviewed          Again, thank you for allowing me to participate in the care of your patient.        Sincerely,        Tim Rouse MD

## 2023-09-15 NOTE — PROGRESS NOTES
In person evaluation      HPI  9/8/2020, video visit  11/24/2020, in person visit  1/31/2022, in person visit  9/14/2022, in person visit  9/18/2023, in person visit        63-year-old followed neurologically for:  Multiple sclerosis diagnosed sometime around 2000/1999  Spasticity and tightness of his legs  Spastic gait which aggravates his hips  Some restless leg type symptomatology  Past history of lumbar fusion L3-L5 in the past    Since last seen  No surgeries  No hospitalizations  No major illnesses   No falls or injuries      Patient stopped the Neurontin which was low-dose did not think it did much  Does not feel that he wants to be on it    Patient uses the vera packs for his restless leg  Has 2 different sized tablets                         2 PM        6 PM        nightly  Mirapex        0.125           0.5           0.125      This dosing schedule works the best  Is on baclofen 10 mg tablet usually uses 2/day but can take up to 4  Has a wide-based stance and spastic type gait  Does have a cane for longer distances  We reviewed gait safety    We did discuss immunomodulating medications   We discussed Tecfidera  He has not had any flareups for a long time  Does not want to be on any immune modulating medications    Does have sometimes worse symptoms during the heat    Patient enjoys woodworking  He actually has a sawmill  He built himself a log cabin which they might turn into a rental  In the winter he has a pole barn that he did does woodwork out there cold bothers him a little bit but not like the heat humidity              A.  Multiple sclerosis diagnosed around 1999/2000       Spasticity and tightness in his legs       Spasticity aggravates his gait and hips          Has had difficulty with side effects from medicines and actually passed out with 1 of the injectable medicine so Gilenya would not be a good choice for him        Have discussed Tecfidera        We discussed immune modulation medications  prefers not to go on any at this time             He has some chronic lazy eye and double vision horizontally split but he does not think it is a lot worse         Still does not want to use a cane or walking stick         Talk to him about gait safety though as he has been climbing on ladders recommended that he not do this due to his poor balance         Does not really have radicular symptoms          Denies any new bowel or bladder difficulty      B.  Restless leg syndrome        Worse in the right leg        More of a burning dysesthesias in his hip unclear if this is related to the restless leg or the MS plaque in his cervical spine        Has a restless leg gets more so on the right leg and the left         Mirapex 0.125 mg tablet 1 - 2/day on top of the 0.5 mg tablet      C.   Memory/attention difficulties         Initial evaluation November 2020         Wife confirms that he will forget things in the middle of a conversation with a slight hand he is back on track          It can happen daily          Work-up for memory loss        B12 594, TSH 0.23        MRI scan September 22, 2020 stable compared to 2005        EEG normal awake drowsy and sleep stage I record        Formal Willard cognitive assessment score 29 out of 30      D.  Disrupted sleep from his restless leg        History of chronic fatigue with his MS        Does snore some but not significant symptoms of sleep apnea      E.  COVID illness October 4, 2021       Did get the COVID vaccine but not the booster       Developed Covid illness October 4, 2021 very tired fatigued short of breath did not be hospitalized took 3 weeks to get better       Has some rhonchi type sounds on his right lung base was a smoker but I do not recall those in the past        to follow-up with his primary to have his lungs checked      F.    L3-5 lumbar fusion 2014 by Dr. Nayana Verma         Did get a cortisone injection in the past (2021), but it did not  really help        Patient s past history:  Multiple sclerosis diagnosed in 2000/1999 with LP and MRI, followed by Dr. Tru Snow, treated with Copaxone until October 2017.     A.  Had an anaphylactic type reaction to Copaxone sometime after being on the medication for almost 15 years.     B.  Had a flare-up of paresthesias around the belly button or below in early or mid June 2018 and has spastic legs chronically with some bladder urgency chronically.  Low lumbar pain, L3-L5 fusion in the past.  2014 Dr. Verma  Eczema.  Restless legs syndrome.  Diverticulosis.  Carpal tunnel syndrome.  Hyperlipidemia  Chronic bronchitis      Habits  Patient does smoke a quarter pack of cigarettes per day,  Alcohol, might have two beers or so per week.    Family history is positive for:  Mother with breast cancer, may have gone to the bone.  Father is living at 88.  Four sisters relatively healthy.  One brother with MS.  Two other brothers relatively healthy.      some workup in July 2018 showing:  B12 578, TSH 0.44.  July 2018  MRI scan of the head with and without contrast, July 2018, scattered T2 signal FLAIR changes consistent with MS, but no active enhancement.  of the cervical spine, no cord lesions, but degenerative changes, see official report.  Thoracic spine MRI shows: July 2018  a. T8 6 mm cord lesion with equivocal enhancement.  b. T10 old demyelinating lesion.  EMG on October 24, 2018 of the lower extremities which was relatively normal.        Work-up for memory loss 2020  Labs September 17, 2020  TSH 0.23, B12 594  MRI scan brain September 22, 2020  T1 lesion load low  T2 lesion load moderate  No enhancing lesions  Similar plaque load compared to 2005 stable  EEG normal awake drowsy and sleep stage I record    MoCA    11/24/2020,           29 out of 30            Exam    Review of system  No headache no chest pain or shortness of breath no nausea vomiting no diarrhea no fever chills    No dysphagia or  dysarthria  Can have some rare diplopia versus blurry vision at the end of the day when he is tired noticed more so when he is watching TV covers 1 eye better    Does have some snoring  Has difficulty with some fatigue chronic from his MS    Has some difficulty with spastic gait chronic    No new focal weakness numbness or tingling    Urination okay  Bladder control adequate    Otherwise review of systems negative    General exam  Blood pressure 123/80, pulse 74  HEENT normal does wear corrective lenses  Lungs lungs clear today  Heart rate regular  Abdomen soft  Symmetrical pulses  No edema the feet  Straight leg raising sign negative   Range of motion of the hips normal without pain    Neurologic exam  Alert oriented x3  Prosody speech normal  Naming normal  Repetition normal  Comprehension normal  No aphasia  No neglect  Memory okay  (MoCA 29 out of 30, November 2020), had some mild difficulty with sequencing visual spatial    Cranial 2 through 12 significant for  Chronic lazy eye  No obvious ophthalmoplegia  No nystagmus  Visual fields intact  Talks outside of his mouth chronic baseline  Tongue twisters good  No Stuart Massimo phenomenon  No red desaturation    Upper extremities  No drift  No tremor  Finger-nose question subtle incoordination    Lower extremities  Mild increased tone bilaterally slightly spastic  Neck straight leg raising sign  No actual weakness    Gait  Gets up from the chair without difficulty  Slightly wider base stance  As a tightness to his legs with the hesitancy spastic gait    Very subtle positive Romberg has his legs far apart    Neuro exam stable         Assessment/Plan    1.  MULTIPLE SCLEROSIS (G35)        History of multiple sclerosis since 1999 currently off immunosuppression       Failed Copaxone and beta interferons in the past, had passing out with 1 of the interferons          Have discussed Tecfidera in the past and he would like to hold off        Symptomatic medicine for  treatment of his difficulties    Baclofen 10 mg tab 1 tab 4 times daily adjust the times of dosing depending on what he needs  Mirapex 0.5 mg at nighttime  Mirapex 0.125 mg 1 tablet p.o. twice daily uses more as needed   vitamin D 3 supplement    Currently off of immunosuppression did not want to start any new treatments did rediscuss Tecfidera      Neurontin 100 mg capsule 3 times daily as needed can use closer to nighttime for the burning dysesthesias in his hip          2.   Memory difficulty        Work-up 2020 unremarkable, MRI scan head stable, B12 TSH normal, EEG normal,        MoCA 29 out of 30, (November 24, 2020)    3.  Gait difficulty       Spasticity and leg difficulties are mainly from his cord plaque      Currently, he has:  1. Multiple sclerosis since 1999, off immunosuppression.  2. Failed Copaxone and beta interferons in the past.  3. Last exacerbation was probably in June 2018.  4. Mildly active lesion 6 mm cord lesion at T8 on MRI scan on July 12, 2018.  Follow-up MRI scan head September 22, 2020 similar to 2005 no active lesions  5. Restless legs syndrome, on Mirapex.  6. Spasticity on baclofen.  7. Rediscussed the Tecfidera.  8.  Restless leg syndrome/burning dysesthesias in the hip    Recommend:  Consider seeing Ortho about the Hips   Continue Baclofen 10 mg tablet, 4 times daily  Mirapex 0.5 mg once at nighttime.  Mirapex 0.125 mg one tablet p.o. b.i.d., uses more p.r.n. using a little bit more regularly now  Vitamin D3 supplement.      If in the future memory is falling off consider looking at a sleep study  Patient wants to hold off at this time    Patient had some laboratory data back and February 2023 which was reviewed  Medications refilled  Discussed about immunomodulation and gait safety  Discussed side effects Mirapex including OCD tendencies    Patient will follow-up on a yearly basis    Total care time today 32 minutes

## 2023-09-18 ENCOUNTER — OFFICE VISIT (OUTPATIENT)
Dept: NEUROLOGY | Facility: CLINIC | Age: 63
End: 2023-09-18
Payer: MEDICARE

## 2023-09-18 VITALS
SYSTOLIC BLOOD PRESSURE: 123 MMHG | DIASTOLIC BLOOD PRESSURE: 80 MMHG | WEIGHT: 161 LBS | BODY MASS INDEX: 25.88 KG/M2 | HEIGHT: 66 IN | HEART RATE: 74 BPM

## 2023-09-18 DIAGNOSIS — M54.16 LUMBAR RADICULOPATHY: ICD-10-CM

## 2023-09-18 DIAGNOSIS — G35 MULTIPLE SCLEROSIS (H): Primary | ICD-10-CM

## 2023-09-18 DIAGNOSIS — G25.81 RESTLESS LEG SYNDROME: ICD-10-CM

## 2023-09-18 PROCEDURE — 99214 OFFICE O/P EST MOD 30 MIN: CPT | Performed by: PSYCHIATRY & NEUROLOGY

## 2023-09-18 RX ORDER — ASPIRIN 81 MG/1
81 TABLET ORAL DAILY
COMMUNITY
Start: 2022-11-07

## 2023-09-18 RX ORDER — PRAMIPEXOLE DIHYDROCHLORIDE 0.5 MG/1
0.5 TABLET ORAL AT BEDTIME
Qty: 90 TABLET | Refills: 3 | Status: SHIPPED | OUTPATIENT
Start: 2023-09-18 | End: 2024-09-24

## 2023-09-18 RX ORDER — PRAMIPEXOLE DIHYDROCHLORIDE 0.12 MG/1
0.12 TABLET ORAL 2 TIMES DAILY
Qty: 180 TABLET | Refills: 3 | Status: SHIPPED | OUTPATIENT
Start: 2023-09-18 | End: 2024-09-24

## 2023-09-18 RX ORDER — FLUTICASONE PROPIONATE 50 MCG
2 SPRAY, SUSPENSION (ML) NASAL
COMMUNITY
Start: 2023-06-30

## 2023-09-18 RX ORDER — ATORVASTATIN CALCIUM 80 MG/1
1 TABLET, FILM COATED ORAL AT BEDTIME
COMMUNITY
Start: 2022-12-19

## 2023-09-18 NOTE — NURSING NOTE
Chief Complaint   Patient presents with    MS     Annual follow up.  Pt states he is doing well.  Denies any concerns.     Faby Mao LPN on 9/18/2023 at 11:24 AM

## 2023-09-18 NOTE — LETTER
9/18/2023         RE: Tim rDiscoll  [address]  [facility] [address]        Dear Colleague,    Thank you for referring your patient, Tim Driscoll, to the [facility] [place]. Please see a copy of my visit note below.    In person evaluation      HPI  9/8/2020, video visit  11/24/2020, in person visit  1/31/2022, in person visit  9/14/2022, in person visit  9/18/2023, in person visit        63-year-old followed neurologically for:  Multiple sclerosis diagnosed sometime around 2000/1999  Spasticity and tightness of his legs  Spastic gait which aggravates his hips  Some restless leg type symptomatology  Past history of lumbar fusion L3-L5 in the past    Since last seen  No surgeries  No hospitalizations  No major illnesses   No falls or injuries      Patient stopped the Neurontin which was low-dose did not think it did much  Does not feel that he wants to be on it    Patient uses the vera packs for his restless leg  Has 2 different sized tablets                         2 PM        6 PM        nightly  Mirapex        0.125           0.5           0.125      This dosing schedule works the best  Is on baclofen 10 mg tablet usually uses 2/day but can take up to 4  Has a wide-based stance and spastic type gait  Does have a cane for longer distances  We reviewed gait safety    We did discuss immunomodulating medications   We discussed Tecfidera  He has not had any flareups for a long time  Does not want to be on any immune modulating medications    Does have sometimes worse symptoms during the heat    Patient enjoys woodworking  He actually has a sawmill  He built himself a log cabin which they might turn into a rental  In the winter he has a pole barn that he did does woodwork out there cold bothers him a little bit but not like the heat humidity              A.  Multiple sclerosis diagnosed around 1999/2000       Spasticity and tightness in his legs       Spasticity  aggravates his gait and hips          Has had difficulty with side effects from medicines and actually passed out with 1 of the injectable medicine so Gilenya would not be a good choice for him        Have discussed Tecfidera        We discussed immune modulation medications prefers not to go on any at this time             He has some chronic lazy eye and double vision horizontally split but he does not think it is a lot worse         Still does not want to use a cane or walking stick         Talk to him about gait safety though as he has been climbing on ladders recommended that he not do this due to his poor balance         Does not really have radicular symptoms          Denies any new bowel or bladder difficulty      B.  Restless leg syndrome        Worse in the right leg        More of a burning dysesthesias in his hip unclear if this is related to the restless leg or the MS plaque in his cervical spine        Has a restless leg gets more so on the right leg and the left         Mirapex 0.125 mg tablet 1 - 2/day on top of the 0.5 mg tablet      C.   Memory/attention difficulties         Initial evaluation November 2020         Wife confirms that he will forget things in the middle of a conversation with a slight hand he is back on track          It can happen daily          Work-up for memory loss        B12 594, TSH 0.23        MRI scan September 22, 2020 stable compared to 2005        EEG normal awake drowsy and sleep stage I record        Formal Quinton cognitive assessment score 29 out of 30      D.  Disrupted sleep from his restless leg        History of chronic fatigue with his MS        Does snore some but not significant symptoms of sleep apnea      E.  COVID illness October 4, 2021       Did get the COVID vaccine but not the booster       Developed Covid illness October 4, 2021 very tired fatigued short of breath did not be hospitalized took 3 weeks to get better       Has some rhonchi type sounds on  his right lung base was a smoker but I do not recall those in the past        to follow-up with his primary to have his lungs checked      F.    L3-5 lumbar fusion 2014 by Dr. Nayana Verma         Did get a cortisone injection in the past (2021), but it did not really help        Patient s past history:  Multiple sclerosis diagnosed in 2000/1999 with LP and MRI, followed by Dr. Tru Snow, treated with Copaxone until October 2017.     A.  Had an anaphylactic type reaction to Copaxone sometime after being on the medication for almost 15 years.     B.  Had a flare-up of paresthesias around the belly button or below in early or mid June 2018 and has spastic legs chronically with some bladder urgency chronically.  Low lumbar pain, L3-L5 fusion in the past.  2014 Dr. Verma  Eczema.  Restless legs syndrome.  Diverticulosis.  Carpal tunnel syndrome.  Hyperlipidemia  Chronic bronchitis      Habits  Patient does smoke a quarter pack of cigarettes per day,  Alcohol, might have two beers or so per week.    Family history is positive for:  Mother with breast cancer, may have gone to the bone.  Father is living at .  Four sisters relatively healthy.  One brother with MS.  Two other brothers relatively healthy.      some workup in July 2018 showing:  B12 578, TSH 0.44.  July 2018  MRI scan of the head with and without contrast, July 2018, scattered T2 signal FLAIR changes consistent with MS, but no active enhancement.  of the cervical spine, no cord lesions, but degenerative changes, see official report.  Thoracic spine MRI shows: July 2018  a. T8 6 mm cord lesion with equivocal enhancement.  b. T10 old demyelinating lesion.  EMG on October 24, 2018 of the lower extremities which was relatively normal.        Work-up for memory loss 2020  Labs September 17, 2020  TSH 0.23, B12 594  MRI scan brain September 22, 2020  T1 lesion load low  T2 lesion load moderate  No enhancing lesions  Similar plaque load  compared to 2005 stable  EEG normal awake drowsy and sleep stage I record    MoCA    11/24/2020,           29 out of 30            Exam    Review of system  No headache no chest pain or shortness of breath no nausea vomiting no diarrhea no fever chills    No dysphagia or dysarthria  Can have some rare diplopia versus blurry vision at the end of the day when he is tired noticed more so when he is watching TV covers 1 eye better    Does have some snoring  Has difficulty with some fatigue chronic from his MS    Has some difficulty with spastic gait chronic    No new focal weakness numbness or tingling    Urination okay  Bladder control adequate    Otherwise review of systems negative    General exam  Blood pressure 123/80, pulse 74  HEENT normal does wear corrective lenses  Lungs lungs clear today  Heart rate regular  Abdomen soft  Symmetrical pulses  No edema the feet  Straight leg raising sign negative   Range of motion of the hips normal without pain    Neurologic exam  Alert oriented x3  Prosody speech normal  Naming normal  Repetition normal  Comprehension normal  No aphasia  No neglect  Memory okay  (MoCA 29 out of 30, November 2020), had some mild difficulty with sequencing visual spatial    Cranial 2 through 12 significant for  Chronic lazy eye  No obvious ophthalmoplegia  No nystagmus  Visual fields intact  Talks outside of his mouth chronic baseline  Tongue twisters good  No Stuart Massimo phenomenon  No red desaturation    Upper extremities  No drift  No tremor  Finger-nose question subtle incoordination    Lower extremities  Mild increased tone bilaterally slightly spastic  Neck straight leg raising sign  No actual weakness    Gait  Gets up from the chair without difficulty  Slightly wider base stance  As a tightness to his legs with the hesitancy spastic gait    Very subtle positive Romberg has his legs far apart    Neuro exam stable         Assessment/Plan    1.  MULTIPLE SCLEROSIS (G35)        History of  multiple sclerosis since 1999 currently off immunosuppression       Failed Copaxone and beta interferons in the past, had passing out with 1 of the interferons          Have discussed Tecfidera in the past and he would like to hold off        Symptomatic medicine for treatment of his difficulties    Baclofen 10 mg tab 1 tab 4 times daily adjust the times of dosing depending on what he needs  Mirapex 0.5 mg at nighttime  Mirapex 0.125 mg 1 tablet p.o. twice daily uses more as needed   vitamin D 3 supplement    Currently off of immunosuppression did not want to start any new treatments did rediscuss Tecfidera      Neurontin 100 mg capsule 3 times daily as needed can use closer to nighttime for the burning dysesthesias in his hip          2.   Memory difficulty        Work-up 2020 unremarkable, MRI scan head stable, B12 TSH normal, EEG normal,        MoCA 29 out of 30, (November 24, 2020)    3.  Gait difficulty       Spasticity and leg difficulties are mainly from his cord plaque      Currently, he has:  1. Multiple sclerosis since 1999, off immunosuppression.  2. Failed Copaxone and beta interferons in the past.  3. Last exacerbation was probably in June 2018.  4. Mildly active lesion 6 mm cord lesion at T8 on MRI scan on July 12, 2018.  Follow-up MRI scan head September 22, 2020 similar to 2005 no active lesions  5. Restless legs syndrome, on Mirapex.  6. Spasticity on baclofen.  7. Rediscussed the Tecfidera.  8.  Restless leg syndrome/burning dysesthesias in the hip    Recommend:  Consider seeing Ortho about the Hips   Continue Baclofen 10 mg tablet, 4 times daily  Mirapex 0.5 mg once at nighttime.  Mirapex 0.125 mg one tablet p.o. b.i.d., uses more p.r.n. using a little bit more regularly now  Vitamin D3 supplement.      If in the future memory is falling off consider looking at a sleep study  Patient wants to hold off at this time    Patient had some laboratory data back and February 2023 which was  reviewed  Medications refilled  Discussed about immunomodulation and gait safety  Discussed side effects Mirapex including OCD tendencies    Patient will follow-up on a yearly basis    Total care time today 32 minutes          Again, thank you for allowing me to participate in the care of your patient.        Sincerely,        magnolia Rouse MD

## 2023-10-16 DIAGNOSIS — G35 MULTIPLE SCLEROSIS (H): ICD-10-CM

## 2023-10-17 RX ORDER — BACLOFEN 10 MG/1
10 TABLET ORAL 4 TIMES DAILY
Qty: 360 TABLET | Refills: 3 | Status: SHIPPED | OUTPATIENT
Start: 2023-10-17 | End: 2024-09-24

## 2023-10-17 NOTE — TELEPHONE ENCOUNTER
Refill request for: baclofen 10mg  Directions: Take 1 tablet (10 mg) by mouth 4 times daily     LOV: 09/18/23  NOV: 09/24/24    90 day supply with 3 refills Medication T'd for review and signature    Faby Mao LPN on 10/17/2023 at 1:11 PM

## 2024-09-23 NOTE — PROGRESS NOTES
In person evaluation      HPI  9/8/2020, video visit  11/24/2020, in person visit  1/31/2022, in person visit  9/14/2022, in person visit  9/18/2023, in person visit  9/24/2024, in person visit      64-year-old followed neurologically for:  Multiple sclerosis diagnosed sometime around 2000/1999  Spasticity and tightness of his legs  Spastic gait which aggravates his hips  Some restless leg type symptomatology  Past history of lumbar fusion L3-L5 in the past    Since last seen  No surgeries  No hospitalizations  No major illnesses   No falls or injuries      Patient stopped the Neurontin which was low-dose did not think it did much  Does not feel that he wants to be on it    Patient uses the vera packs for his restless leg  Has 2 different sized tablets                         2 PM        6 PM        nightly  Mirapex        0.125           0.5           0.125      This dosing schedule works the best  Is on baclofen 10 mg tablet usually uses 2/day but can take up to 4  Has a wide-based stance and spastic type gait  Does have a cane for longer distances  We reviewed gait safety    Patient does have some urinary urgency did see a urologist did not like his recommendations  Might see a different 1    He is using a cane at times specially on uneven surfaces or ice      We did discuss immunomodulating medications   We discussed Tecfidera  He has not had any flareups for a long time  Does not want to be on any immune modulating medications    Does have sometimes worse symptoms during the heat    Patient enjoys woodworking  He actually has a sawmill  He built himself a log cabin which they might turn into a rental  In the winter he has a pole barn that he did does woodwork out there cold bothers him a little bit but not like the heat humidity              A.  Multiple sclerosis diagnosed around 1999/2000       Spasticity and tightness in his legs       Spasticity aggravates his gait and hips          Has had difficulty with  side effects from medicines and actually passed out with 1 of the injectable medicine so Gilenya would not be a good choice for him        Have discussed Tecfidera        We discussed immune modulation medications prefers not to go on any at this time             He has some chronic lazy eye and double vision horizontally split but he does not think it is a lot worse         Still does not want to use a cane or walking stick         Talk to him about gait safety though as he has been climbing on ladders recommended that he not do this due to his poor balance         Does not really have radicular symptoms          Denies any new bowel or bladder difficulty      B.  Restless leg syndrome        Worse in the right leg        More of a burning dysesthesias in his hip unclear if this is related to the restless leg or the MS plaque in his cervical spine        Has a restless leg gets more so on the right leg and the left         Mirapex 0.125 mg tablet 1 - 2/day on top of the 0.5 mg tablet      C.   Memory/attention difficulties         Initial evaluation November 2020         Wife confirms that he will forget things in the middle of a conversation with a slight hand he is back on track          It can happen daily          Work-up for memory loss        B12 594, TSH 0.23        MRI scan September 22, 2020 stable compared to 2005        EEG normal awake drowsy and sleep stage I record        Formal [place] cognitive assessment score 29 out of 30      D.  Disrupted sleep from his restless leg        History of chronic fatigue with his MS        Does snore some but not significant symptoms of sleep apnea      E.  COVID illness October 4, 2021       Did get the COVID vaccine but not the booster       Developed Covid illness October 4, 2021 very tired fatigued short of breath did not be hospitalized took 3 weeks to get better       Has some rhonchi type sounds on his right lung base was a smoker but I do not recall those in  the past        to follow-up with his primary to have his lungs checked      F.    L3-5 lumbar fusion 2014 by Dr. Nayana Verma         Did get a cortisone injection in the past (2021), but it did not really help        Patient s past history:  Multiple sclerosis diagnosed in 2000/1999 with LP and MRI, followed by Dr. Tru Snow, treated with Copaxone until October 2017.     A.  Had an anaphylactic type reaction to Copaxone sometime after being on the medication for almost 15 years.     B.  Had a flare-up of paresthesias around the belly button or below in early or mid June 2018 and has spastic legs chronically with some bladder urgency chronically.  Low lumbar pain, L3-L5 fusion in the past.  2014 Dr. Verma  Eczema.  Restless legs syndrome.  Diverticulosis.  Carpal tunnel syndrome.  Hyperlipidemia  Chronic bronchitis      Habits  Patient does smoke a quarter pack of cigarettes per day,  Alcohol, might have two beers or so per week.    Family history is positive for:  Mother with breast cancer, may have gone to the bone.  Father is living at .  Four sisters relatively healthy.  One brother with MS.  Two other brothers relatively healthy.      some workup in July 2018 showing:  B12 578, TSH 0.44.  July 2018  MRI scan of the head with and without contrast, July 2018, scattered T2 signal FLAIR changes consistent with MS, but no active enhancement.  of the cervical spine, no cord lesions, but degenerative changes, see official report.  Thoracic spine MRI shows: July 2018  a. T8 6 mm cord lesion with equivocal enhancement.  b. T10 old demyelinating lesion.  EMG on October 24, 2018 of the lower extremities which was relatively normal.        Work-up for memory loss 2020  Labs September 17, 2020  TSH 0.23, B12 594  MRI scan brain September 22, 2020  T1 lesion load low  T2 lesion load moderate  No enhancing lesions  Similar plaque load compared to 2005 stable  EEG normal awake drowsy and sleep  stage I record    Laboratory data review                   9/2022 2/2023  NA/K        141/4.4  BUN/Cr    14/0.92  GLU         85  AST                          27  HDL/LDL                  45/110  TSH         0.54      MoCA    11/24/2020,           29 out of 30            Exam    Review of system  Pertinent positives and negatives  No headache no chest pain no shortness of breath no nausea vomiting no diarrhea no fever chills  No dysarthria no dysphagia      Can have some rare diplopia versus blurry vision at the end of the day when he is tired noticed more so when he is watching TV covers 1 eye better    Does have some snoring  Has difficulty with some fatigue chronic from his MS    Has some difficulty with spastic gait chronic    No new focal weakness numbness or tingling    Urination okay  Bladder control adequate    Otherwise review of systems negative    General exam  Blood pressure 132/92, pulse 75  HEENT normal does wear corrective lenses  Lungs lungs clear today  Heart rate regular  Abdomen soft  Symmetrical pulses  No edema the feet  Straight leg raising sign negative   Range of motion of the hips normal without pain    Neurologic exam  Alert oriented x3  Prosody speech normal  Naming normal  Repetition normal  Comprehension normal  No aphasia  No neglect  Memory okay  (MoCA 29 out of 30, November 2020), had some mild difficulty with sequencing visual spatial    Cranial 2 through 12 significant for  Chronic lazy eye  No obvious ophthalmoplegia  No nystagmus  Visual fields intact  Talks outside of his mouth chronic baseline  Tongue twisters good  No Stuart Massimo phenomenon  No red desaturation    Upper extremities  No drift  No tremor  Finger-nose question subtle incoordination    Lower extremities  Mild increased tone bilaterally slightly spastic  Neck straight leg raising sign  No actual weakness    Gait  Gets up from the chair without difficulty  Slightly wider base stance  As a tightness to his legs  with the hesitancy spastic gait    Very subtle positive Romberg has his legs far apart    Neuroexam stable         Assessment/Plan    1.  MULTIPLE SCLEROSIS (G35)        History of multiple sclerosis since 1999 currently off immunosuppression       Failed Copaxone and Beta interferons in the past, had passing out with 1 of the interferons          Have discussed Tecfidera in the past and he would like to hold off        Symptomatic medicine for treatment of his difficulties    Baclofen 10 mg tab 1 tab 4 times daily adjust the times of dosing depending on what he needs  Mirapex 0.5 mg at nighttime  Mirapex 0.125 mg 1 tablet p.o. twice daily uses more as needed   vitamin D 3 supplement    Currently off of immunosuppression did not want to start any new treatments did rediscuss Tecfidera            2.   Memory difficulty        Work-up 2020 unremarkable, MRI scan head stable, B12 TSH normal, EEG normal,        MoCA 29 out of 30, (November 24, 2020)    3.  Gait difficulty       Spasticity and leg difficulties are mainly from his cord plaque      Currently, he has:  1. Multiple sclerosis since 1999, off immunosuppression.  2. Failed Copaxone and beta interferons in the past.  3. Last exacerbation was probably in June 2018.  4. Mildly active lesion 6 mm cord lesion at T8 on MRI scan on July 12, 2018.  Follow-up MRI scan head September 22, 2020 similar to 2005 no active lesions  5. Restless legs syndrome, on Mirapex.  6. Spasticity on baclofen.  7. Rediscussed the Tecfidera.  8.  Restless leg syndrome/burning dysesthesias in the hip    Recommend:  Consider seeing Ortho about the Hips   Continue Baclofen 10 mg tablet, 4 times daily  Mirapex 0.5 mg once at nighttime.  Mirapex 0.125 mg one tablet p.o. b.i.d., uses more p.r.n. using a little bit more regularly now  Vitamin D3 supplement.      If in the future memory is falling off consider looking at a sleep study  Patient wants to hold off at this time    Medications  refilled  Discussed about immunomodulation and gait safety  Discussed side effects Mirapex including OCD tendencies  Patient follow-up on yearly basis      Total care time today 30 minutes  The longitudinal plan of care for the diagnosis(es)/condition(s) as documented were addressed during this visit. Due to the added complexity in care, I will continue to support Tim in the subsequent management and with ongoing continuity of care.      As part of visit today  Reviewed EMR notes  Reviewed labs  Reviewed primary MD note 4/24/2024 chronic left SI joint pain/injection

## 2024-09-24 ENCOUNTER — OFFICE VISIT (OUTPATIENT)
Dept: NEUROLOGY | Facility: CLINIC | Age: 64
End: 2024-09-24
Payer: MEDICARE

## 2024-09-24 VITALS
BODY MASS INDEX: 26.03 KG/M2 | HEIGHT: 66 IN | DIASTOLIC BLOOD PRESSURE: 92 MMHG | SYSTOLIC BLOOD PRESSURE: 132 MMHG | WEIGHT: 162 LBS | HEART RATE: 75 BPM

## 2024-09-24 DIAGNOSIS — M54.16 LUMBAR RADICULOPATHY: ICD-10-CM

## 2024-09-24 DIAGNOSIS — G25.81 RESTLESS LEG SYNDROME: Primary | ICD-10-CM

## 2024-09-24 DIAGNOSIS — G35 MULTIPLE SCLEROSIS (H): ICD-10-CM

## 2024-09-24 PROCEDURE — 99214 OFFICE O/P EST MOD 30 MIN: CPT | Performed by: PSYCHIATRY & NEUROLOGY

## 2024-09-24 PROCEDURE — G2211 COMPLEX E/M VISIT ADD ON: HCPCS | Performed by: PSYCHIATRY & NEUROLOGY

## 2024-09-24 RX ORDER — PRAMIPEXOLE DIHYDROCHLORIDE 0.5 MG/1
0.5 TABLET ORAL AT BEDTIME
Qty: 90 TABLET | Refills: 3 | Status: SHIPPED | OUTPATIENT
Start: 2024-09-24

## 2024-09-24 RX ORDER — PRAMIPEXOLE DIHYDROCHLORIDE 0.12 MG/1
0.12 TABLET ORAL 2 TIMES DAILY
Qty: 180 TABLET | Refills: 3 | Status: SHIPPED | OUTPATIENT
Start: 2024-09-24

## 2024-09-24 RX ORDER — BACLOFEN 10 MG/1
10 TABLET ORAL 4 TIMES DAILY
Qty: 360 TABLET | Refills: 3 | Status: SHIPPED | OUTPATIENT
Start: 2024-09-24

## 2024-09-24 NOTE — NURSING NOTE
Chief Complaint   Patient presents with    MS     Annual follow up. Pt states he is doing well. Denies any new concerns.     Faby Mao LPN on 9/24/2024 at 9:32 AM

## 2024-09-24 NOTE — LETTER
9/24/2024      Tim Driscoll  [address] [id number]      Dear Colleague,    Thank you for referring your patient, Tim Driscoll, to the [facility] [place]. Please see a copy of my visit note below.    In person evaluation      HPI  9/8/2020, video visit  11/24/2020, in person visit  1/31/2022, in person visit  9/14/2022, in person visit  9/18/2023, in person visit  9/24/2024, in person visit      64-year-old followed neurologically for:  Multiple sclerosis diagnosed sometime around 2000/1999  Spasticity and tightness of his legs  Spastic gait which aggravates his hips  Some restless leg type symptomatology  Past history of lumbar fusion L3-L5 in the past    Since last seen  No surgeries  No hospitalizations  No major illnesses   No falls or injuries      Patient stopped the Neurontin which was low-dose did not think it did much  Does not feel that he wants to be on it    Patient uses the vera packs for his restless leg  Has 2 different sized tablets                         2 PM        6 PM        nightly  Mirapex        0.125           0.5           0.125      This dosing schedule works the best  Is on baclofen 10 mg tablet usually uses 2/day but can take up to 4  Has a wide-based stance and spastic type gait  Does have a cane for longer distances  We reviewed gait safety    Patient does have some urinary urgency did see a urologist did not like his recommendations  Might see a different 1    He is using a cane at times specially on uneven surfaces or ice      We did discuss immunomodulating medications   We discussed Tecfidera  He has not had any flareups for a long time  Does not want to be on any immune modulating medications    Does have sometimes worse symptoms during the heat    Patient enjoys woodworking  He actually has a sawmill  He built himself a log cabin which they might turn into a rental  In the winter he has a pole barn that he did does woodwork  out there cold bothers him a little bit but not like the heat humidity              A.  Multiple sclerosis diagnosed around 1999/2000       Spasticity and tightness in his legs       Spasticity aggravates his gait and hips          Has had difficulty with side effects from medicines and actually passed out with 1 of the injectable medicine so Gilenya would not be a good choice for him        Have discussed Tecfidera        We discussed immune modulation medications prefers not to go on any at this time             He has some chronic lazy eye and double vision horizontally split but he does not think it is a lot worse         Still does not want to use a cane or walking stick         Talk to him about gait safety though as he has been climbing on ladders recommended that he not do this due to his poor balance         Does not really have radicular symptoms          Denies any new bowel or bladder difficulty      B.  Restless leg syndrome        Worse in the right leg        More of a burning dysesthesias in his hip unclear if this is related to the restless leg or the MS plaque in his cervical spine        Has a restless leg gets more so on the right leg and the left         Mirapex 0.125 mg tablet 1 - 2/day on top of the 0.5 mg tablet      C.   Memory/attention difficulties         Initial evaluation November 2020         Wife confirms that he will forget things in the middle of a conversation with a slight hand he is back on track          It can happen daily          Work-up for memory loss        B12 594, TSH 0.23        MRI scan September 22, 2020 stable compared to 2005        EEG normal awake drowsy and sleep stage I record        Formal [place] cognitive assessment score 29 out of 30      D.  Disrupted sleep from his restless leg        History of chronic fatigue with his MS        Does snore some but not significant symptoms of sleep apnea      E.  COVID illness October 4, 2021       Did get the COVID  vaccine but not the booster       Developed Covid illness October 4, 2021 very tired fatigued short of breath did not be hospitalized took 3 weeks to get better       Has some rhonchi type sounds on his right lung base was a smoker but I do not recall those in the past        to follow-up with his primary to have his lungs checked      F.    L3-5 lumbar fusion 2014 by Dr. Nayana Verma         Did get a cortisone injection in the past (2021), but it did not really help        Patient s past history:  Multiple sclerosis diagnosed in 2000/1999 with LP and MRI, followed by Dr. Tru Snow, treated with Copaxone until October 2017.     A.  Had an anaphylactic type reaction to Copaxone sometime after being on the medication for almost 15 years.     B.  Had a flare-up of paresthesias around the belly button or below in early or mid June 2018 and has spastic legs chronically with some bladder urgency chronically.  Low lumbar pain, L3-L5 fusion in the past.  2014 Dr. Verma  Eczema.  Restless legs syndrome.  Diverticulosis.  Carpal tunnel syndrome.  Hyperlipidemia  Chronic bronchitis      Habits  Patient does smoke a quarter pack of cigarettes per day,  Alcohol, might have two beers or so per week.    Family history is positive for:  Mother with breast cancer, may have gone to the bone.  Father is living at 88.  Four sisters relatively healthy.  One brother with MS.  Two other brothers relatively healthy.      some workup in July 2018 showing:  B12 578, TSH 0.44.  July 2018  MRI scan of the head with and without contrast, July 2018, scattered T2 signal FLAIR changes consistent with MS, but no active enhancement.  of the cervical spine, no cord lesions, but degenerative changes, see official report.  Thoracic spine MRI shows: July 2018  a. T8 6 mm cord lesion with equivocal enhancement.  b. T10 old demyelinating lesion.  EMG on October 24, 2018 of the lower extremities which was relatively normal.         Work-up for memory loss 2020  Labs September 17, 2020  TSH 0.23, B12 594  MRI scan brain September 22, 2020  T1 lesion load low  T2 lesion load moderate  No enhancing lesions  Similar plaque load compared to 2005 stable  EEG normal awake drowsy and sleep stage I record    Laboratory data review                   9/2022 2/2023  NA/K        141/4.4  BUN/Cr    14/0.92  GLU         85  AST                          27  HDL/LDL                  45/110  TSH         0.54      MoCA    11/24/2020,           29 out of 30            Exam    Review of system  Pertinent positives and negatives  No headache no chest pain no shortness of breath no nausea vomiting no diarrhea no fever chills  No dysarthria no dysphagia      Can have some rare diplopia versus blurry vision at the end of the day when he is tired noticed more so when he is watching TV covers 1 eye better    Does have some snoring  Has difficulty with some fatigue chronic from his MS    Has some difficulty with spastic gait chronic    No new focal weakness numbness or tingling    Urination okay  Bladder control adequate    Otherwise review of systems negative    General exam  Blood pressure 132/92, pulse 75  HEENT normal does wear corrective lenses  Lungs lungs clear today  Heart rate regular  Abdomen soft  Symmetrical pulses  No edema the feet  Straight leg raising sign negative   Range of motion of the hips normal without pain    Neurologic exam  Alert oriented x3  Prosody speech normal  Naming normal  Repetition normal  Comprehension normal  No aphasia  No neglect  Memory okay  (MoCA 29 out of 30, November 2020), had some mild difficulty with sequencing visual spatial    Cranial 2 through 12 significant for  Chronic lazy eye  No obvious ophthalmoplegia  No nystagmus  Visual fields intact  Talks outside of his mouth chronic baseline  Tongue twisters good  No Stuart Massimo phenomenon  No red desaturation    Upper extremities  No drift  No tremor  Finger-nose question  subtle incoordination    Lower extremities  Mild increased tone bilaterally slightly spastic  Neck straight leg raising sign  No actual weakness    Gait  Gets up from the chair without difficulty  Slightly wider base stance  As a tightness to his legs with the hesitancy spastic gait    Very subtle positive Romberg has his legs far apart    Neuroexam stable         Assessment/Plan    1.  MULTIPLE SCLEROSIS (G35)        History of multiple sclerosis since 1999 currently off immunosuppression       Failed Copaxone and Beta interferons in the past, had passing out with 1 of the interferons          Have discussed Tecfidera in the past and he would like to hold off        Symptomatic medicine for treatment of his difficulties    Baclofen 10 mg tab 1 tab 4 times daily adjust the times of dosing depending on what he needs  Mirapex 0.5 mg at nighttime  Mirapex 0.125 mg 1 tablet p.o. twice daily uses more as needed   vitamin D 3 supplement    Currently off of immunosuppression did not want to start any new treatments did rediscuss Tecfidera            2.   Memory difficulty        Work-up 2020 unremarkable, MRI scan head stable, B12 TSH normal, EEG normal,        MoCA 29 out of 30, (November 24, 2020)    3.  Gait difficulty       Spasticity and leg difficulties are mainly from his cord plaque      Currently, he has:  1. Multiple sclerosis since 1999, off immunosuppression.  2. Failed Copaxone and beta interferons in the past.  3. Last exacerbation was probably in June 2018.  4. Mildly active lesion 6 mm cord lesion at T8 on MRI scan on July 12, 2018.  Follow-up MRI scan head September 22, 2020 similar to 2005 no active lesions  5. Restless legs syndrome, on Mirapex.  6. Spasticity on baclofen.  7. Rediscussed the Tecfidera.  8.  Restless leg syndrome/burning dysesthesias in the hip    Recommend:  Consider seeing Ortho about the Hips   Continue Baclofen 10 mg tablet, 4 times daily  Mirapex 0.5 mg once at nighttime.  Mirapex  0.125 mg one tablet p.o. b.i.d., uses more p.r.n. using a little bit more regularly now  Vitamin D3 supplement.      If in the future memory is falling off consider looking at a sleep study  Patient wants to hold off at this time    Medications refilled  Discussed about immunomodulation and gait safety  Discussed side effects Mirapex including OCD tendencies  Patient follow-up on yearly basis      Total care time today 30 minutes  The longitudinal plan of care for the diagnosis(es)/condition(s) as documented were addressed during this visit. Due to the added complexity in care, I will continue to support Tim in the subsequent management and with ongoing continuity of care.      As part of visit today  Reviewed EMR notes  Reviewed labs  Reviewed primary MD note 4/24/2024 chronic left SI joint pain/injection      Again, thank you for allowing me to participate in the care of your patient.        Sincerely,        tim Rouse MD